# Patient Record
Sex: FEMALE | Race: WHITE | NOT HISPANIC OR LATINO | Employment: UNEMPLOYED | RURAL
[De-identification: names, ages, dates, MRNs, and addresses within clinical notes are randomized per-mention and may not be internally consistent; named-entity substitution may affect disease eponyms.]

---

## 2018-12-28 ENCOUNTER — TRANSCRIBE ORDERS (OUTPATIENT)
Dept: PHYSICAL THERAPY | Facility: HOSPITAL | Age: 58
End: 2018-12-28

## 2018-12-28 DIAGNOSIS — M54.2 NECK PAIN: ICD-10-CM

## 2018-12-28 DIAGNOSIS — M54.9 BACK PAIN, UNSPECIFIED BACK LOCATION, UNSPECIFIED BACK PAIN LATERALITY, UNSPECIFIED CHRONICITY: Primary | ICD-10-CM

## 2019-01-03 ENCOUNTER — HOSPITAL ENCOUNTER (OUTPATIENT)
Dept: PHYSICAL THERAPY | Facility: HOSPITAL | Age: 59
Setting detail: THERAPIES SERIES
Discharge: HOME | End: 2019-01-03

## 2019-01-03 DIAGNOSIS — M54.9 BACK PAIN, UNSPECIFIED BACK LOCATION, UNSPECIFIED BACK PAIN LATERALITY, UNSPECIFIED CHRONICITY: Primary | ICD-10-CM

## 2019-01-03 DIAGNOSIS — M54.2 NECK PAIN: ICD-10-CM

## 2019-01-03 PROCEDURE — 97161 PT EVAL LOW COMPLEX 20 MIN: CPT | Performed by: PHYSICAL THERAPIST

## 2019-01-03 PROCEDURE — 97110 THERAPEUTIC EXERCISES: CPT | Performed by: PHYSICAL THERAPIST

## 2019-01-04 NOTE — THERAPY EVALUATION
Outpatient Physical Therapy Ortho Initial Evaluation  [facility]     Patient Name: Carin Figueredo  : 1960  MRN: [id number]  Today's Date: 1/3/2019      Visit Date: 2019     Subjective Improvement: N/A      Attendance:   Approved:   20 visits        MD follow up:   PRN        RC date:     19      There is no problem list on file for this patient.       Past Medical History:   Diagnosis Date   • Hypertension         Past Surgical History:   Procedure Laterality Date   • APPENDECTOMY     • CHOLECYSTECTOMY     • FOOT SURGERY Right    • HYSTERECTOMY       Allergies   Allergen Reactions   • Keflex [Cephalexin] Other (See Comments)     Upset stomach          Medications: Will bring in list next visit    Visit Dx:     ICD-10-CM ICD-9-CM   1. Back pain, unspecified back location, unspecified back pain laterality, unspecified chronicity M54.9 724.5   2. Neck pain M54.2 723.1       Patient History     Row Name 19 1000             History    Chief Complaint  Pain;Numbness;Joint stiffness;Difficulty with daily activities  -KG      Type of Pain  Back pain;Neck pain  -KG      Date Current Problem(s) Began  -- Chronic; worsened over the past year  -KG      Brief Description of Current Complaint  Pt presents with c/o chronic neck and back pain. Pt states it has started to get worse over the past year. 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 Pt states she has muscle spasms/tightness in between her shoulder blades, superior shoulder, and into her neck. States she does have numbness tingling in the R hand/fingers, mostly in first 3 digits. Pt states her neck also feels stiff most days. Pt states her low back hurts at times as well. States it feels like it starts at her mid back and works it's way down. States it even effects her legs as well, more so her L vs  Right. States it's more of a cramping in her legs. Does take potassium and vitamin D.  -KG      Patient/Caregiver Goals  Relieve pain;Return to work;Improve mobility;Improve strength;Return to prior level of function  -KG      Hand Dominance  right-handed  -KG      Occupation/sports/leisure activities  Works at [employer]. Pt is/was working as a CNA but unable to do that at this time due to pain. 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 Pt enjoys reading and crocheting, spending time grandkids  -KG      Results of Clinical Tests  Per pt report: Osteoponia, degenerative disc disease, bulging discs (unsure if the disc is in her neck or back)  -KG         Pain     Pain Location  Neck;Back  -KG      Pain at Present  4  -KG      Pain at Best  3  -KG      Pain at Worst  8  -KG      Pain Frequency  Constant/continuous  -KG      Pain Description  Aching;Numbness;Sharp;Tingling  -KG      What Performance Factors Make the Current Problem(s) WORSE?  Lifting, bending, housecleaning, CNA work duties  -KG      What Performance Factors Make the Current Problem(s) BETTER?  MHP helps at times.  -KG      Tolerance Time- Standing  15-20 minutes  -KG      Tolerance Time- Sitting  Short periods; causes more increased low back pain  -KG      Tolerance Time- Walking  Increased distances or when up on feet for extended periods of time.  -KG      Tolerance Time- Lying  Pt is a side sleeper; constantly changing positions  -KG      Is your sleep disturbed?  Yes  -KG      What position do you sleep in?  Left sidelying;Right sidelying  -KG      Difficulties at work?  Unable to complete regular job duties such as lifting and transferring pt's.  -KG      Difficulties with ADL's?  Independent but with increased pain  -KG      Difficulties with recreational activities?  Unable to read or chrochet. Has pain playing with grandchildren, mostly in hands and neck  -KG        User Key  (r) = Recorded By, (t) = Taken By, (c) = Cosigned By     Initials Name Provider Type    KG Nevin Perry ANTONIA, PT Physical Therapist          PT Ortho     Row Name 01/03/19 0900       Precautions and Contraindications    Precautions/Limitations  --  -KG    Precautions  Osteopenia  -KG       Subjective Pain    Able to rate subjective pain?  yes  -KG    Pre-Treatment Pain Level  4  -KG    Post-Treatment Pain Level  3  -KG       Posture/Observations    Posture/Observations Comments  No acute distress. Ambulates with non-antalgic gait. Fair/poor postural awareness noted overall. Decreased lordosis noted in cervical spine. R ASIS inferior vs L. R sacral base more prominent. Pt very hypersensitive to mild pressure at lateral hip/IT band and piriformis.  -KG       Quarter Clearing    Quarter Clearing  Upper Quarter Clearing;Lower Quarter Clearing  -KG       DTR- Upper Quarter Clearing    Biceps (C5/6)  Bilateral:;2- Normal response  -KG    Brachioradialis (C6)  Bilateral:;2- Normal response  -KG    Triceps (C7)  Bilateral:;2- Normal response  -KG       Sensory Screen for Light Touch- Upper Quarter Clearing    C4 (posterior shoulder)  Bilateral:;Intact  -KG    C5 (lateral upper arm)  Bilateral:;Intact  -KG    C6 (tip of thumb)  Left:;Diminished;Right:;Intact  -KG    C7 (tip of 3rd finger)  Left:;Diminished;Right:;Intact  -KG    C8 (tip of 5th finger)  Left:;Diminished;Right:;Intact  -KG    T1 (medial lower arm)  Bilateral:;Intact  -KG       Myotomal Screen- Upper Quarter Clearing    Shoulder flexion (C5)  Bilateral:;WNL  -KG    Elbow flexion/wrist extension (C6)  Bilateral:;WNL  -KG    Elbow extension/wrist flexion (C7)  Bilateral:;WNL  -KG    Finger flexion/ (C8)  Bilateral:;WNL  -KG    Finger abduction (T1)  Bilateral:;WNL  -KG       Cervical/Shoulder ROM Screen    Cervical flexion  Normal  -KG    Cervical extension  Normal  -KG    Cervical lateral flexion  Impaired  -KG    Cervical rotation  Impaired  -KG    Cervical quadrant (Spurling's)  Normal  -KG       Neural Tension  Signs- Lower Quarter Clearing    Slump  Bilateral:;Negative  -KG    SLR  Bilateral:;Negative  -KG       Sensory Screen for Light Touch- Lower Quarter Clearing    L1 (inguinal area)  Bilateral:;Intact  -KG    L2 (anterior mid thigh)  Bilateral:;Intact  -KG    L3 (distal anterior thigh)  Bilateral:;Intact  -KG    L4 (medial lower leg/foot)  Bilateral:;Intact  -KG    L5 (lateral lower leg/great toe)  Bilateral:;Intact  -KG    S1 (bottom of foot)  Bilateral:;Intact  -KG       Myotomal Screen- Lower Quarter Clearing    Hip flexion (L2)  Bilateral:;WNL  -KG    Knee extension (L3)  Bilateral:;WNL  -KG    Ankle DF (L4)  Bilateral:;WNL  -KG    Great toe extension (L5)  Bilateral:;WNL  -KG    Ankle PF (S1)  Bilateral:;WNL  -KG       Lumbar ROM Screen- Lower Quarter Clearing    Lumbar Flexion  Normal  -KG    Lumbar Extension  Impaired 70% full motion; Mild low back pain  -KG    Lumbar Lateral Flexion  Normal Mild midline low back pain bilaterally  -KG       SI/Hip Screen- Lower Quarter Clearing    ASIS compression  Negative  -KG    ASIS distraction  Negative  -KG    Mac's/Izaiah's test  Negative  -KG       Special Tests/Palpation    Special Tests/Palpation  Cervical/Thoracic;Lumbar/SI  -KG       Cervical Palpation    Cervical Palpation- Location?  Cervical facets;Levator scapula;Upper traps;Middle traps;Rhomboids  -KG    Cervical Facets  Left:;Tender C3-C6  -KG    Levator Scapula  Bilateral:;Tender;Guarded/taut R>L  -KG    Upper Traps  Bilateral:;Tender;Guarded/taut R>L  -KG    Middle Traps  Bilateral:;Tender  -KG    Rhomboids  Bilateral:;Tender  -KG       Lumbosacral Palpation    Lumbosacral Palpation?  Yes  -KG    SI  Bilateral:;Tender  -KG    Piriformis  Right:;Tender;Guarded/taut Very hypersensitive to mild pressure  -KG    Erector Spinae (Paraspinals)  Bilateral:;Tender;Guarded/taut Low lumbar  -KG       General ROM    Head/Neck/Trunk  Neck Extension;Neck Flexion;Neck Lt Lateral Flexion;Neck Rt Lateral Flexion;Neck Lt  Rotation;Neck Rt Rotation  -KG    RT Upper Ext  Rt Shoulder Flexion;Rt Shoulder ABduction  -KG    LT Upper Ext  Lt Shoulder ABduction;Lt Shoulder Flexion  -KG    GENERAL ROM COMMENTS  BLE hip/knee/ankle AROM WFL an non-painful. Hip PROM WFL bilaterally, no pain  -KG       Head/Neck/Trunk    Neck Extension AROM  40  -KG    Neck Flexion AROM  60; stretch post neck  -KG    Neck Lt Lateral Flexion AROM  32; stretch contralalterally  -KG    Neck Rt Lateral Flexion AROM  32; stretch contralaterally  -KG    Neck Lt Rotation AROM  60; stretch in upper back  -KG    Neck Rt Rotation AROM  65  -KG       Right Upper Ext    Rt Shoulder Abduction AROM  WNL  -KG    Rt Shoulder Flexion AROM  WNL  -KG    Rt Upper Extremity Comments   No pain  -KG       Left Upper Ext    Lt Shoulder Abduction AROM  WNL  -KG    Lt Shoulder Flexion AROM  WNL  -KG    Lt Upper Extremity Comments   No pain  -KG       MMT (Manual Muscle Testing)    Rt Upper Ext  Rt Shoulder Flexion;Rt Shoulder ABduction  -KG    Lt Upper Ext  Lt Shoulder Flexion;Lt Shoulder ABduction  -KG    Rt Lower Ext  Rt Hip Flexion;Rt Hip ABduction;Rt Hip ADduction;Rt Knee Extension;Rt Knee Flexion;Rt Ankle Dorsiflexion  -KG    Lt Lower Ext  Lt Hip Flexion;Lt Hip ABduction;Lt Hip ADduction;Lt Knee Extension;Lt Knee Flexion;Lt Ankle Dorsiflexion  -KG       MMT Right Upper Ext    Rt Shoulder Flexion MMT, Gross Movement  (5/5) normal  -KG    Rt Shoulder ABduction MMT, Gross Movement  (5/5) normal  -KG       MMT Left Upper Ext    Lt Shoulder Flexion MMT, Gross Movement  (5/5) normal  -KG    Lt Shoulder ABduction MMT, Gross Movement  (5/5) normal  -KG       MMT Right Lower Ext    Rt Hip Flexion MMT, Gross Movement  (4+/5) good plus  -KG    Rt Hip ABduction MMT, Gross Movement  (4+/5) good plus  -KG    Rt Hip ADduction MMT, Gross Movement  (4+/5) good plus  -KG    Rt Knee Extension MMT, Gross Movement  (5/5) normal  -KG    Rt Knee Flexion MMT, Gross Movement  (5/5) normal  -KG    Rt Ankle  Dorsiflexion MMT, Gross Movement  (5/5) normal  -KG       MMT Left Lower Ext    Lt Hip Flexion MMT, Gross Movement  (4+/5) good plus  -KG    Lt Hip ABduction MMT, Gross Movement  (4+/5) good plus  -KG    Lt Hip ADduction MMT, Gross Movement  (4+/5) good plus  -KG    Lt Knee Extension MMT, Gross Movement  (5/5) normal  -KG    Lt Knee Flexion MMT, Gross Movement  (5/5) normal  -KG    Lt Ankle Dorsiflexion MMT, Gross Movement  (5/5) normal  -KG       Sensation    Sensation WNL?  WFL  -KG    Light Touch  Partial deficits in the LUE  -KG       Flexibility    Flexibility Tested?  Upper Extremity;Lower Extremity  -KG       Lower Extremity Flexibility    Hamstrings  Bilateral:;Mildly limited  -KG    Hip External Rotators  Bilateral:;Mildly limited  -KG      User Key  (r) = Recorded By, (t) = Taken By, (c) = Cosigned By    Initials Name Provider Type    KG Nevin Perry, PT Physical Therapist                      Therapy Education  Education Details: POC education. Anatomy education related to condition. Posture education. HEP: see exercise flow sheet for details  Given: HEP, Symptoms/condition management, Pain management, Posture/body mechanics  Program: New  How Provided: Verbal, Demonstration, Written  Provided to: Patient  Level of Understanding: Teach back education performed, Verbalized, Demonstrated           PT OP Goals     Row Name  01/13/19 1000          PT Short Term Goals    STG Date to Achieve  01/24/19  -KG     STG 1  Demonstrate independence/compliance with HEP  -KG     STG 1 Progress  New  -KG     STG 2  Tolerate 45 minute treatment session without increased pain  -KG     STG 2 Progress  New  -KG     STG 3  Demonstrate independence in isometric TrA activities throughout treatment session for carryover into performance of daily functional activities  -KG     STG 3 Progress  New  -KG     STG 4  Subjectively report decreased pain/symptoms in upper back/scapula by 50%  -KG     STG 4 Progress  New  -KG         Long Term Goals    LTG Date to Achieve  02/07/19  -KG     LTG 1  Subjectively report 60% improvement or greater  -KG     LTG 1 Progress  New  -KG     LTG 2  Demonstrate improved Modified oswestry score to 30% or less  -KG     LTG 2 Progress  New  -KG     LTG 3  Demonstrate improved NDI score to 30% or less  -KG     LTG 3 Progress  New  -KG     LTG 4  Demonstrate independence in aquatic treatment session for carryover into independent management  -KG     LTG 4 Progress  New  -KG     LTG 5  Demonstrate independent in proper posture/body mechanics for carryover into performance of daily functional activities  -KG     LTG 5 Progress  New  -KG     LTG 6  Demonstrate bilateral cervical rotation AROM to 70 deg without increased pain  -KG     LTG 6 Progress  New  -KG     LTG 7  Tolerate 15 minutes of standing therex without increased pain  -KG     LTG 7 Progress  New  -KG        Time Calculation    PT Goal Re-Cert Due Date  01/24/19  -KG       User Key  (r) = Recorded By, (t) = Taken By, (c) = Cosigned By    Initials Name Provider Type    KG Nevin Perry, PT Physical Therapist          PT Assessment/Plan     Row Name  01/03/19 1000          PT Assessment    Functional Limitations  Impaired locomotion;Limitation in home management;Limitations in community activities;Limitations in functional capacity and performance;Performance in leisure activities;Performance in self-care ADL  -KG     Impairments  Impaired flexibility;Impaired muscle endurance;Joint mobility;Muscle strength;Pain;Poor body mechanics;Posture;Range of motion  -KG     Assessment Comments  Pt is a 58 y.o. femal presenting with chronic neck & back pain. Neurotension signs negative in BLE's at this time. Pt with possible arthritic changes in cervical spine, decreased cerivcal lordosis present. Pt overall presents with deficits in core & postural stability. Taut/TTP throughout UT's/levator as well as in hips/piriformis R>L. Pt does present with lumbopelvic  malalignment. Pt will benefit from aquatic therapy for improved overall activity tolerance. Pt will benefit from skilled PT services to address these deficits for improvements in overall postura/core stability, functional strength/mobility, and tolerance to daily functional activities.   -KG     Please refer to paper survey for additional self-reported information  Yes  -KG     Rehab Potential  Good  -KG     Patient/caregiver participated in establishment of treatment plan and goals  Yes  -KG     Patient would benefit from skilled therapy intervention  Yes  -KG        PT Plan    PT Frequency  2x/week  -KG     Predicted Duration of Therapy Intervention (Therapy Eval)  4-6 weeks  -KG     Planned CPT's?  PT EVAL LOW COMPLEXITY: 45864;PT RE-EVAL: 80692;PT THER PROC EA 15 MIN: 77583;PT THER ACT EA 15 MIN: 11357;PT MANUAL THERAPY EA 15 MIN: 35583;PT NEUROMUSC RE-EDUCATION EA 15 MIN: 96353;PT AQUATIC THERAPY EA 15 MIN: 54421;PT ELECTRICAL STIM UNATTEND: ;PT THER SUPP EA 15 MIN  -KG     Physical Therapy Interventions (Optional Details)  aquatics exercise;home exercise program;joint mobilization;lumbar stabilization;dry needling;manual therapy techniques;modalities;neuromuscular re-education;patient/family education;postural re-education;strengthening;stretching;ROM (Range of Motion)  -KG     PT Plan Comments  Will begin 1x/pool, 1x/land per week. If pt with good tolerance to both land & pool, will potentially see pt for back to back land/pool. Focus on overall postural & core stabliziation. Global hip/neck/UE stretching. Manual to cervical spine to include STM/MFR to UT/LS/paraspinals/sub-occipitals and posterior hip/lumbar paraspinals. Modalities prn for pain.  -KG       User Key  (r) = Recorded By, (t) = Taken By, (c) = Cosigned By    Initials Name Provider Type    KG Nevin Perry, PT Physical Therapist         Modalities     Row Name 01/03/19 0900             Subjective Comments    Subjective Comments  Refer to  "therapy pt history for details.  -KG        User Key  (r) = Recorded By, (t) = Taken By, (c) = Cosigned By    Initials Name Provider Type    Nevin Raines, PT Physical Therapist         MHP to low/mid back and c-spine for 15 minutes    Manual Therapy: Cervical spine; bilateral UT/levator; interscap muscles: STM/MFR. Manual cervical distraction, PROM, manual UT stretch for 6 min         Exercises     Row Name  01/03/19 0900             Precautions    Existing Precautions/Restrictions  Other (see comments) (Osteopenia)  -KG         Subjective Comments    Subjective Comments  Refer to therapy pt history for details.  -KG         Subjective Pain    Able to rate subjective pain?  yes  -KG      Pre-Treatment Pain Level  4  -KG      Post-Treatment Pain Level  3  -KG         Aquatics    Aquatics performed?  No  -KG         Exercise 1    Exercise Name 1  Supine piriformis stretch  -KG      Cueing 1  Verbal  -KG      Reps 1  2  -KG      Time 1  30\" hold  -KG         Exercise 2    Exercise Name 2  SKTC stretch  -KG      Cueing 2  Verbal  -KG      Reps 2  2  -KG      Time 2  30\" hold  -KG         Exercise 3    Exercise Name 3  Supine chin tucks  -KG      Cueing 3  Verbal  -KG      Sets 3  1  -KG      Reps 3  10  -KG         Exercise 4    Exercise Name 4  UT stretch  -KG      Cueing 4  Verbal  -KG      Reps 4  2  -KG      Time 4  30\" hold  -KG         Exercise 5    Exercise Name 5  Levator stretch  -KG      Cueing 5  Verbal  -KG      Reps 5  2  -KG      Time 5  30\" hold  -KG         Exercise 6    Exercise Name 6  Scap retraction  -KG      Cueing 6  Verbal  -KG      Sets 6  1  -KG      Reps 6  10  -KG        User Key  (r) = Recorded By, (t) = Taken By, (c) = Cosigned By    Initials Name Provider Type    Nevin Raines, PT Physical Therapist                   Outcome Measure Options: Leanna Segura, Neck Disability Index (NDI)  Modified Oswestry  Modified Oswestry Score/Comments: 44%  Neck Disability Index  Section 1 " - Pain Intensity: The pain is moderate at the moment.  Section 2 - Personal Care: I can look after myself normally without causing extra pain.  Section 3 - Lifting: Pain prevents me from lifting heavy weights, but I can manage light weights if they are conveniently positioned.  Section 4 - Work: I can't do my usual work.  Section 5 - Headaches: I have moderate headaches that come infrequently.  Section 6 - Concentration: I have a fair degree of difficulty concentrating.  Section 7 - Sleeping: My sleep is mildly disturbed for up to 1-2 hours.  Section 8 - Driving: I can drive as long as I want with slight neck pain.  Section 9 - Reading: I can't read as much as I want because of moderate neck pain.  Section 10 - Recreation: I have neck pain with most recreational activities.  Neck Disability Index Score: 21  Neck Disability Index Comments: 42%      Time Calculation:     Therapy Suggested Charges     Code   Minutes Charges    None             Start Time: 1022  Stop Time: 1129  Time Calculation (min): 67 min  Total Timed Code Minutes- PT: 52 minute(s)     Therapy Charges for Today     Code Description Service Date Service Provider Modifiers Qty    65795087955 HC PT EVAL LOW COMPLEXITY 2 1/3/2019 Nevin Perry, PT GP 1    03582726878 HC PT THER PROC EA 15 MIN 1/3/2019 Nevin Perry, PT GP 1    00124334791 HC PT THER SUPP EA 15 MIN 1/3/2019 Nevin Perry, PT GP 1          PT G-Codes  Outcome Measure Options: Leanna Segura, Neck Disability Index (NDI)  Modified Oswestry Score/Comments: 44%  Neck Disability Index Score: 21         Nevin Perry PT  1/3/2019

## 2019-01-07 ENCOUNTER — HOSPITAL ENCOUNTER (OUTPATIENT)
Dept: PHYSICAL THERAPY | Facility: HOSPITAL | Age: 59
Setting detail: THERAPIES SERIES
Discharge: HOME | End: 2019-01-07

## 2019-01-07 DIAGNOSIS — M54.9 BACK PAIN, UNSPECIFIED BACK LOCATION, UNSPECIFIED BACK PAIN LATERALITY, UNSPECIFIED CHRONICITY: Primary | ICD-10-CM

## 2019-01-07 DIAGNOSIS — M54.2 NECK PAIN: ICD-10-CM

## 2019-01-07 PROCEDURE — 97113 AQUATIC THERAPY/EXERCISES: CPT

## 2019-01-07 NOTE — THERAPY TREATMENT NOTE
Outpatient Physical Therapy Ortho Treatment Note  [facility]     Patient Name: Carin Figueredo  : 1960  MRN: [id number]  Today's Date: 2019      Visit Date: 2019  Subjective Improvement: N/A      Attendance:   Approved:   20 visits        MD follow up:   PRN        RC date:     19      Visit Dx:    ICD-10-CM ICD-9-CM   1. Back pain, unspecified back location, unspecified back pain laterality, unspecified chronicity M54.9 724.5   2. Neck pain M54.2 723.1       There is no problem list on file for this patient.       Past Medical History:   Diagnosis Date   • Hypertension         Past Surgical History:   Procedure Laterality Date   • APPENDECTOMY     • CHOLECYSTECTOMY     • FOOT SURGERY Right    • HYSTERECTOMY         PT Ortho     Row Name 19 1400       Subjective Comments    Subjective Comments  Pt states that her neck is bothering her more than her low back and hips today.  -PM       Precautions and Contraindications    Precautions/Limitations  other (see comments) Pt cannot swim  -PM    Precautions  Osteopenia  -PM       Subjective Pain    Able to rate subjective pain?  yes  -PM    Pre-Treatment Pain Level  5 more neck  -PM    Post-Treatment Pain Level  7 low back; neck 4/10  -PM       Posture/Observations    Posture/Observations Comments  cues for postural alignment in H2o  -PM      User Key  (r) = Recorded By, (t) = Taken By, (c) = Cosigned By    Initials Name Provider Type    PM Clara Barraza PTA Physical Therapy Assistant                      PT Assessment/Plan     Row Name 19 1400          PT Assessment    Assessment Comments  Pt required numerous cues to control posture with AQ walking, some less with excs. Pt with incr LBP post Rx and advised to stretch and rest/use ice or heat as she prefers.  -PM        PT Plan    PT Frequency  2x/week  -PM     Predicted Duration of Therapy Intervention (Therapy Eval)  4-6 weeks  -PM     PT Plan Comments   "1P/1L per PT POC; AQ deep scissor lat  -PM       User Key  (r) = Recorded By, (t) = Taken By, (c) = Cosigned By    Initials Name Provider Type    PM Clara Barraza PTA Physical Therapy Assistant              Exercises     Row Name 01/07/19 1400             Subjective Comments    Subjective Comments  Pt states that her neck is bothering her more than her low back and hips today.  -PM         Subjective Pain    Able to rate subjective pain?  yes  -PM      Pre-Treatment Pain Level  5 more neck  -PM      Post-Treatment Pain Level  7 low back; neck 4/10  -PM         Aquatics    Aquatics performed?  Yes  -PM         Exercise 1    Exercise Name 1  AQ  walk  forward  -PM      Cueing 1  Verbal;Demo  -PM      Time 1  3'  -PM         Exercise 2    Exercise Name 2  AQ walk lat  -PM      Cueing 2  Verbal;Demo  -PM      Time 2  2'  -PM         Exercise 3    Exercise Name 3  AQ Heel/Toe Raises  -PM      Cueing 3  Verbal  -PM      Reps 3  15  -PM         Exercise 4    Exercise Name 4  AQ 3 way SLR B with posture/iso TA  -PM      Cueing 4  Verbal;Demo  -PM      Reps 4  15 ea  -PM      Additional Comments  deferred flex SLR d/t beginning LBP incr  -PM         Exercise 5    Exercise Name 5  AQ MS w/TA  -PM      Cueing 5  Verbal;Demo  -PM      Reps 5  15  -PM         Exercise 6    Exercise Name 6  AQ wall sit with TA push pull scap ret  -PM      Cueing 6  Verbal;Demo  -PM      Sets 6  1  -PM      Reps 6  15  -PM      Additional Comments  medium Cookie  -PM         Exercise 7    Exercise Name 7  AQ wall sit w/TA push downs  -PM      Cueing 7  Verbal;Tactile  -PM      Sets 7  1  -PM      Reps 7  15  -PM         Exercise 8    Exercise Name 8  AQ wall sit w/TA shoulder hor abd  -PM      Cueing 8  Verbal;Demo  -PM      Sets 8  1  -PM      Reps 8  15  -PM         Exercise 9    Exercise Name 9  AQ UT S  -PM      Cueing 9  Verbal  -PM      Reps 9  2  -PM      Time 9  20\"  -PM         Exercise 10    Exercise Name 10  AQ deep H20 w/Noodle " "and hold to wall bicycle  -PM      Cueing 10  Verbal  -PM      Time 10  2'  -PM         Exercise 11    Exercise Name 11  AQ deep H20 with N and HHA wall hang for distraction/relaxation  -PM      Cueing 11  Verbal  -PM      Time 11  3'  -PM         Exercise 12    Exercise Name 12  AQ HS S @ stairs  -PM      Cueing 12  Verbal  -PM      Reps 12  2  -PM      Time 12  20\"  -PM        User Key  (r) = Recorded By, (t) = Taken By, (c) = Cosigned By    Initials Name Provider Type    PM Clara Barraza, PTA Physical Therapy Assistant                         PT OP Goals     Row Name 01/07/19 1400          PT Short Term Goals    STG Date to Achieve  01/24/19  -PM     STG 1  Demonstrate independence/compliance with HEP  -PM     STG 1 Progress  Ongoing  -PM     STG 2  Tolerate 45 minute treatment session without increased pain  -PM     STG 2 Progress  Ongoing  -PM     STG 3  Demonstrate independence in isometric TrA activities throughout treatment session for carryover into performance of daily functional activities  -PM     STG 3 Progress  Ongoing  -PM     STG 4  Subjectively report decreased pain/symptoms in upper back/scapula by 50%  -PM     STG 4 Progress  Ongoing  -PM        Long Term Goals    LTG Date to Achieve  02/07/19  -PM     LTG 1  Subjectively report 60% improvement or greater  -PM     LTG 1 Progress  Ongoing  -PM     LTG 2  Demonstrate improved Modified oswestry score to 30% or less  -PM     LTG 2 Progress  Ongoing  -PM     LTG 3  Demonstrate improved NDI score to 30% or less  -PM     LTG 3 Progress  Ongoing  -PM     LTG 4  Demonstrate independence in aquatic treatment session for carryover into independent management  -PM     LTG 4 Progress  Ongoing  -PM     LTG 5  Demonstrate independent in proper posture/body mechanics for carryover into performance of daily functional activities  -PM     LTG 5 Progress  Ongoing  -PM     LTG 6  Demonstrate bilateral cervical rotation AROM to 70 deg without increased pain  -PM "     LTG 6 Progress  Ongoing  -PM     LTG 7  Tolerate 15 minutes of standing therex without increased pain  -PM     LTG 7 Progress  Ongoing  -PM        Time Calculation    PT Goal Re-Cert Due Date  01/24/18  -PM       User Key  (r) = Recorded By, (t) = Taken By, (c) = Cosigned By    Initials Name Provider Type    PM Clara Barraza PTA Physical Therapy Assistant          Therapy Education  Given: HEP, Symptoms/condition management, Pain management, Posture/body mechanics  Program: Reinforced  How Provided: Verbal, Demonstration, Written  Provided to: Patient  Level of Understanding: Teach back education performed, Verbalized, Demonstrated              Time Calculation:   Start Time: 1442(pt was waiting in WR/office busy/didn't know where to go)  Stop Time: 1530  Time Calculation (min): 48 min  Therapy Suggested Charges     Code   Minutes Charges    None           Therapy Charges for Today     Code Description Service Date Service Provider Modifiers Qty    93220836932 HC PT AQUATIC THERAPY EA 15 MIN 1/7/2019 Clara Barraza PTA GP 3                    Clara Barraza PTA  1/7/2019

## 2019-01-09 ENCOUNTER — HOSPITAL ENCOUNTER (OUTPATIENT)
Dept: PHYSICAL THERAPY | Facility: HOSPITAL | Age: 59
Setting detail: THERAPIES SERIES
Discharge: HOME | End: 2019-01-09

## 2019-01-09 DIAGNOSIS — M54.9 BACK PAIN, UNSPECIFIED BACK LOCATION, UNSPECIFIED BACK PAIN LATERALITY, UNSPECIFIED CHRONICITY: Primary | ICD-10-CM

## 2019-01-09 DIAGNOSIS — M54.2 NECK PAIN: ICD-10-CM

## 2019-01-09 PROCEDURE — 97110 THERAPEUTIC EXERCISES: CPT

## 2019-01-09 PROCEDURE — 97140 MANUAL THERAPY 1/> REGIONS: CPT

## 2019-01-09 NOTE — THERAPY TREATMENT NOTE
Outpatient Physical Therapy Ortho Treatment Note  [facility]     Patient Name: Carin Figueredo  : 1960  MRN: [id number]  Today's Date: 2019      Visit Date: 2019  Subjective Improvement: N/A      Attendance: 3/3  Approved:   20 visits        MD follow up:   PRN        RC date:     19      Visit Dx:    ICD-10-CM ICD-9-CM   1. Back pain, unspecified back location, unspecified back pain laterality, unspecified chronicity M54.9 724.5   2. Neck pain M54.2 723.1       There is no problem list on file for this patient.       Past Medical History:   Diagnosis Date   • Hypertension         Past Surgical History:   Procedure Laterality Date   • APPENDECTOMY     • CHOLECYSTECTOMY     • FOOT SURGERY Right    • HYSTERECTOMY         PT Ortho     Row Name 19 1500       Subjective Comments    Subjective Comments  Pt states she calmed down pretty good after pool Rx; notes she is very stiff today and she has some pn neck and pn more in L leg than in back.  -PM       Precautions and Contraindications    Precautions/Limitations  -- cannot swim  -PM    Precautions  Osteopenia  -PM       Subjective Pain    Able to rate subjective pain?  yes  -PM    Pre-Treatment Pain Level  4 4-5 L Leg; neck 3/10  -PM    Post-Treatment Pain Level  2  -PM    Row Name 19 1400       Subjective Comments    Subjective Comments  Pt states that her neck is bothering her more than her low back and hips today.  -PM       Precautions and Contraindications    Precautions/Limitations  other (see comments) Pt cannot swim  -PM    Precautions  Osteopenia  -PM       Subjective Pain    Able to rate subjective pain?  yes  -PM    Pre-Treatment Pain Level  5 more neck  -PM    Post-Treatment Pain Level  7 low back; neck /10  -PM       Posture/Observations    Posture/Observations Comments  cues for postural alignment in H2o  -PM      User Key  (r) = Recorded By, (t) = Taken By, (c) = Cosigned By    Initials Name  "Provider Type    PM Clara Barraza PTA Physical Therapy Assistant                      PT Assessment/Plan     Row Name 01/09/19 1500          PT Assessment    Assessment Comments  Pt with TP's UT's and areas R thoracic laterally. Good effort and participation. Not I with chin tuck. Decr pain after manual.   -PM        PT Plan    PT Frequency  2x/week  -PM     Predicted Duration of Therapy Intervention (Therapy Eval)  4-6 weeks  -PM     PT Plan Comments  1P/1L; Land teach seated pelvic N and do SL clam with TA  -PM       User Key  (r) = Recorded By, (t) = Taken By, (c) = Cosigned By    Initials Name Provider Type    Clara Mascorro PTA Physical Therapy Assistant          Modalities     Row Name 01/09/19 1500             Precautions    Existing Precautions/Restrictions  other (see comments) Osteopenia  -PM         Moist Heat    Location  Low/mid back & c-spine prone, CS wedge  -PM      Rx Minutes  15 mins  -PM      MH S/P Rx  Yes  -PM        User Key  (r) = Recorded By, (t) = Taken By, (c) = Cosigned By    Initials Name Provider Type    Clara Mascorro PTA Physical Therapy Assistant          Exercises     Row Name 01/09/19 1500             Precautions    Existing Precautions/Restrictions  other (see comments) Osteopenia  -PM         Subjective Comments    Subjective Comments  Pt states she calmed down pretty good after pool Rx; notes she is very stiff today and she has some pn neck and pn more in L leg than in back.  -PM         Subjective Pain    Able to rate subjective pain?  yes  -PM      Pre-Treatment Pain Level  4 4-5 L Leg; neck 3/10  -PM      Post-Treatment Pain Level  2  -PM         Exercise 1    Exercise Name 1  seated UT S B  -PM      Cueing 1  Verbal;Tactile  -PM      Reps 1  2  -PM      Time 1  30\"  -PM         Exercise 2    Exercise Name 2  seated LS S B  -PM      Cueing 2  Verbal;Tactile  -PM      Reps 2  2  -PM      Time 2  30\"  -PM         Exercise 3    Exercise Name 3  No $ " "seated postural diane  -PM      Cueing 3  Verbal;Demo  -PM      Sets 3  1  -PM      Reps 3  15  -PM         Exercise 4    Exercise Name 4  supine SKTC S  -PM      Cueing 4  Verbal  -PM      Reps 4  2  -PM      Time 4  30\"  -PM         Exercise 5    Exercise Name 5  supine piriformis S  -PM      Cueing 5  Verbal  -PM      Reps 5  2  -PM      Time 5  30\"  -PM         Exercise 6    Exercise Name 6  supine HS S  -PM      Cueing 6  Verbal  -PM      Reps 6  2  -PM      Time 6  30\"  -PM         Exercise 7    Exercise Name 7  supine chin tuck  -PM      Cueing 7  Verbal;Tactile  -PM         Exercise 8    Exercise Name 8  Iso TA/Kegal  -PM      Cueing 8  Verbal;Tactile  -PM      Time 8  3'  -PM         Exercise 9    Exercise Name 9  bridge w/TA  -PM      Cueing 9  Verbal  -PM      Sets 9  1  -PM      Reps 9  15  -PM      Time 9  3\"  -PM        User Key  (r) = Recorded By, (t) = Taken By, (c) = Cosigned By    Initials Name Provider Type    PM Clara Barraza PTA Physical Therapy Assistant                        Manual Rx (last 36 hours)      Manual Treatments     Row Name 01/09/19 1500             Manual Rx 1    Manual Rx 1 Location  Cervical spine; bilateral UT/levator; interscap muscles  -PM      Manual Rx 1 Type  STM/MFR. Manual cervical distraction, PROM, manual UT stretch  -PM      Manual Rx 1 Duration  8'  -PM         Manual Rx 2    Manual Rx 2 Location  Thoracic/upper lumbar R>L  -PM      Manual Rx 2 Type  STM/MFR  -PM      Manual Rx 2 Duration  6'  -PM        User Key  (r) = Recorded By, (t) = Taken By, (c) = Cosigned By    Initials Name Provider Type    PM Clara Barraza PTA Physical Therapy Assistant          PT OP Goals     Row Name 01/09/19 1500          PT Short Term Goals    STG Date to Achieve  01/24/19  -PM     STG 1  Demonstrate independence/compliance with HEP  -PM     STG 1 Progress  Ongoing  -PM     STG 2  Tolerate 45 minute treatment session without increased pain  -PM     STG 2 Progress  Ongoing "  -PM     STG 3  Demonstrate independence in isometric TrA activities throughout treatment session for carryover into performance of daily functional activities  -PM     STG 3 Progress  Ongoing  -PM     STG 4  Subjectively report decreased pain/symptoms in upper back/scapula by 50%  -PM     STG 4 Progress  Ongoing  -PM        Long Term Goals    LTG Date to Achieve  02/07/19  -PM     LTG 1  Subjectively report 60% improvement or greater  -PM     LTG 1 Progress  Ongoing  -PM     LTG 2  Demonstrate improved Modified oswestry score to 30% or less  -PM     LTG 2 Progress  Ongoing  -PM     LTG 3  Demonstrate improved NDI score to 30% or less  -PM     LTG 3 Progress  Ongoing  -PM     LTG 4  Demonstrate independence in aquatic treatment session for carryover into independent management  -PM     LTG 4 Progress  Ongoing  -PM     LTG 5  Demonstrate independent in proper posture/body mechanics for carryover into performance of daily functional activities  -PM     LTG 5 Progress  Ongoing  -PM     LTG 6  Demonstrate bilateral cervical rotation AROM to 70 deg without increased pain  -PM     LTG 6 Progress  Ongoing  -PM     LTG 7  Tolerate 15 minutes of standing therex without increased pain  -PM     LTG 7 Progress  Ongoing  -PM        Time Calculation    PT Goal Re-Cert Due Date  01/24/18  -PM       User Key  (r) = Recorded By, (t) = Taken By, (c) = Cosigned By    Initials Name Provider Type    Clara Mascorro, ROSARIO Physical Therapy Assistant          Therapy Education  Given: HEP, Symptoms/condition management, Pain management, Posture/body mechanics  Program: Reinforced  How Provided: Verbal, Demonstration, Written  Provided to: Patient  Level of Understanding: Teach back education performed, Verbalized, Demonstrated              Time Calculation:   Start Time: 1523  Stop Time: 1636  Time Calculation (min): 73 min  Total Timed Code Minutes- PT: 58 minute(s)  Therapy Suggested Charges     Code   Minutes Charges    None            Therapy Charges for Today     Code Description Service Date Service Provider Modifiers Qty    69847533975 HC PT THER PROC EA 15 MIN 1/9/2019 Clara Barraza, PTA GP 3    60194816487 HC PT MANUAL THERAPY EA 15 MIN 1/9/2019 Clara Barraza, PTA GP 1    63235389154 HC PT THER SUPP EA 15 MIN 1/9/2019 Clara Barraza, ROSARIO GP 1                    Clara Barraza, ROSARIO  1/9/2019

## 2019-01-16 ENCOUNTER — APPOINTMENT (OUTPATIENT)
Dept: PHYSICAL THERAPY | Facility: HOSPITAL | Age: 59
End: 2019-01-16

## 2019-01-21 ENCOUNTER — APPOINTMENT (OUTPATIENT)
Dept: PHYSICAL THERAPY | Facility: HOSPITAL | Age: 59
End: 2019-01-21

## 2019-01-23 ENCOUNTER — HOSPITAL ENCOUNTER (OUTPATIENT)
Dept: PHYSICAL THERAPY | Facility: HOSPITAL | Age: 59
Setting detail: THERAPIES SERIES
Discharge: HOME | End: 2019-01-23

## 2019-01-23 DIAGNOSIS — M54.2 NECK PAIN: ICD-10-CM

## 2019-01-23 DIAGNOSIS — M54.9 BACK PAIN, UNSPECIFIED BACK LOCATION, UNSPECIFIED BACK PAIN LATERALITY, UNSPECIFIED CHRONICITY: Primary | ICD-10-CM

## 2019-01-23 PROCEDURE — 97140 MANUAL THERAPY 1/> REGIONS: CPT | Performed by: PHYSICAL THERAPIST

## 2019-01-23 PROCEDURE — 97110 THERAPEUTIC EXERCISES: CPT | Performed by: PHYSICAL THERAPIST

## 2019-01-24 NOTE — THERAPY PROGRESS REPORT/RE-CERT
"    Outpatient Physical Therapy Ortho Progress Note  [facility]     Patient Name: Carin Figueredo  : 1960  MRN: [id number]  Today's Date: 2019      Visit Date: 2019     Subjective Improvement: 25%      Attendance:   Approved:   20 visits        MD follow up:   PRN         date:     19        There is no problem list on file for this patient.       Past Medical History:   Diagnosis Date   • Hypertension         Past Surgical History:   Procedure Laterality Date   • APPENDECTOMY     • CHOLECYSTECTOMY     • FOOT SURGERY Right    • HYSTERECTOMY         Visit Dx:     ICD-10-CM ICD-9-CM   1. Back pain, unspecified back location, unspecified back pain laterality, unspecified chronicity M54.9 724.5   2. Neck pain M54.2 723.1           PT Ortho     Row Name 19 1500       Subjective Comments    Subjective Comments  Pt reports 25% overall improvement at this time. States she feels more \"limber\" overall, neck not as sore. RLE hurting more today.  -KG       Precautions and Contraindications    Precautions/Limitations  -- Pt cannot swim  -KG    Precautions  Osteopenia  -KG       Subjective Pain    Post-Treatment Pain Level  3  -KG       Posture/Observations    Posture/Observations Comments  No acute distress. Ambulates with no AD, non-antalgic gait. Continues to require cuing for good posture throughout therex  -KG       Sensory Screen for Light Touch- Upper Quarter Clearing    C4 (posterior shoulder)  Bilateral:;Intact  -KG    C5 (lateral upper arm)  Bilateral:;Intact  -KG    C6 (tip of thumb)  Bilateral:;Intact  -KG    C7 (tip of 3rd finger)  Left:;Diminished;Right:;Intact  -KG    C8 (tip of 5th finger)  Left:;Diminished;Right:;Intact  -KG    T1 (medial lower arm)  Bilateral:;Intact  -KG       Neural Tension Signs- Lower Quarter Clearing    Slump  Bilateral:;Negative  -KG    SLR  Bilateral:;Negative  -KG       Sensory Screen for Light Touch- Lower Quarter Clearing    L1 " (inguinal area)  Bilateral:;Intact  -KG    L2 (anterior mid thigh)  Bilateral:;Intact  -KG    L3 (distal anterior thigh)  Bilateral:;Intact  -KG    L4 (medial lower leg/foot)  Bilateral:;Intact  -KG    L5 (lateral lower leg/great toe)  Bilateral:;Intact  -KG    S1 (bottom of foot)  Bilateral:;Intact  -KG       Myotomal Screen- Lower Quarter Clearing    Hip flexion (L2)  Bilateral:;WNL  -KG    Knee extension (L3)  Bilateral:;WNL  -KG    Ankle DF (L4)  Bilateral:;WNL  -KG    Great toe extension (L5)  Bilateral:;WNL  -KG    Ankle PF (S1)  Bilateral:;WNL  -KG       Lumbar ROM Screen- Lower Quarter Clearing    Lumbar Flexion  Normal  -KG    Lumbar Extension  Impaired  -KG    Lumbar Lateral Flexion  Normal  -KG       Cervical Palpation    Cervical Facets  Left:;Tender  -KG    Levator Scapula  Bilateral:;Tender;Guarded/taut  -KG    Upper Traps  Bilateral:;Tender;Guarded/taut  -KG    Middle Traps  Bilateral:;Tender;Guarded/taut  -KG    Rhomboids  Bilateral:;Tender  -KG       Lumbosacral Palpation    SI  Bilateral:;Tender  -KG       General ROM    GENERAL ROM COMMENTS  BLE hip/knee/ankle AROM WFL an non-painful. Hip PROM WFL bilaterally, no pain  -KG       Head/Neck/Trunk    Neck Extension AROM  40  -KG    Neck Flexion AROM  48  -KG    Neck Lt Lateral Flexion AROM  40  -KG    Neck Rt Lateral Flexion AROM  35  -KG    Neck Lt Rotation AROM  63  -KG    Neck Rt Rotation AROM  66  -KG       Right Upper Ext    Rt Shoulder Abduction AROM  WNl  -KG    Rt Shoulder Flexion AROM  WNL  -KG    Rt Upper Extremity Comments   No pain  -KG       Left Upper Ext    Lt Shoulder Abduction AROM  WNL  -KG    Lt Shoulder Flexion AROM  WNL no pain  -KG       MMT Right Upper Ext    Rt Shoulder Flexion MMT, Gross Movement  (5/5) normal  -KG    Rt Shoulder ABduction MMT, Gross Movement  (5/5) normal  -KG       MMT Left Upper Ext    Lt Shoulder Flexion MMT, Gross Movement  (5/5) normal  -KG    Lt Shoulder ABduction MMT, Gross Movement  (5/5) normal  -KG        MMT Right Lower Ext    Rt Hip Flexion MMT, Gross Movement  (4+/5) good plus  -KG    Rt Hip ABduction MMT, Gross Movement  (4+/5) good plus  -KG    Rt Hip ADduction MMT, Gross Movement  (4+/5) good plus  -KG    Rt Knee Extension MMT, Gross Movement  (5/5) normal  -KG    Rt Knee Flexion MMT, Gross Movement  (5/5) normal  -KG    Rt Ankle Dorsiflexion MMT, Gross Movement  (5/5) normal  -KG       MMT Left Lower Ext    Lt Hip Flexion MMT, Gross Movement  (4+/5) good plus  -KG    Lt Hip ABduction MMT, Gross Movement  (4+/5) good plus  -KG    Lt Hip ADduction MMT, Gross Movement  (4+/5) good plus  -KG    Lt Knee Extension MMT, Gross Movement  (5/5) normal  -KG    Lt Knee Flexion MMT, Gross Movement  (5/5) normal  -KG    Lt Ankle Dorsiflexion MMT, Gross Movement  (5/5) normal  -KG       Sensation    Sensation WNL?  WFL  -KG    Light Touch  Partial deficits in the LUE  -KG      User Key  (r) = Recorded By, (t) = Taken By, (c) = Cosigned By    Initials Name Provider Type    KG Nevin Perry, PT Physical Therapist                      Therapy Education  Given: HEP, Symptoms/condition management, Pain management, Posture/body mechanics  Program: Reinforced  How Provided: Verbal  Provided to: Patient  Level of Understanding: Verbalized, Demonstrated     PT OP Goals     Row Name 01/23/19 1500          PT Short Term Goals    STG Date to Achieve  01/24/19  -KG     STG 1  Demonstrate independence/compliance with HEP  -KG     STG 1 Progress  Ongoing  -KG     STG 2  Tolerate 45 minute treatment session without increased pain  -KG     STG 2 Progress  Ongoing  -KG     STG 3  Demonstrate independence in isometric TrA activities throughout treatment session for carryover into performance of daily functional activities  -KG     STG 3 Progress  Ongoing  -KG     STG 4  Subjectively report decreased pain/symptoms in upper back/scapula by 50%  -KG     STG 4 Progress  Ongoing  -KG        Long Term Goals    LTG Date to Achieve  02/13/19   -KG     LTG 1  Subjectively report 60% improvement or greater  -KG     LTG 1 Progress  Ongoing  -KG     LTG 2  Demonstrate improved Modified oswestry score to 30% or less  -KG     LTG 2 Progress  Ongoing  -KG     LTG 3  Demonstrate improved NDI score to 30% or less  -KG     LTG 3 Progress  Ongoing  -KG     LTG 4  Demonstrate independence in aquatic treatment session for carryover into independent management  -KG     LTG 4 Progress  Ongoing  -KG     LTG 5  Demonstrate independent in proper posture/body mechanics for carryover into performance of daily functional activities  -KG     LTG 5 Progress  Ongoing  -KG     LTG 6  Demonstrate bilateral cervical rotation AROM to 70 deg without increased pain  -KG     LTG 6 Progress  Ongoing  -KG     LTG 7  Tolerate 15 minutes of standing therex without increased pain  -KG     LTG 7 Progress  Ongoing  -KG        Time Calculation    PT Goal Re-Cert Due Date  02/13/19  -KG       User Key  (r) = Recorded By, (t) = Taken By, (c) = Cosigned By    Initials Name Provider Type    KG Nevin Perry, PT Physical Therapist                PT Assessment/Plan     Row Name  01/23/19 1500          PT Assessment    Functional Limitations  Impaired locomotion;Limitation in home management;Limitations in community activities;Limitations in functional capacity and performance;Performance in leisure activities;Performance in self-care ADL  -KG     Impairments  Impaired flexibility;Impaired muscle endurance;Joint mobility;Muscle strength;Pain;Poor body mechanics;Posture;Range of motion  -KG     Assessment Comments  Pt progressing fair with PT at this time. More pain in RLE/low back this date vs neck. Pt continues to have trigger points at bilateral UT's and interscap muscles that responds well to manual. Increased cuing required for good PN posture and TA activation with seated activities. Pt continues to demonstrate impaired core, cervical, and postural stability and will benefit from continued  "skilled PT services to improve.   -KG     Rehab Potential  Good  -KG     Patient/caregiver participated in establishment of treatment plan and goals  Yes  -KG     Patient would benefit from skilled therapy intervention  Yes  -KG        PT Plan    PT Frequency  1x/week  -KG     Predicted Duration of Therapy Intervention (Therapy Eval)  3-4 weeks  -KG     PT Plan Comments  Continue seated PN education, LE stretching, cervical/core/postural stabilization activites. Add SL clamshells to HEP next visit. Will continue 1x/week per pt request.  -KG       User Key  (r) = Recorded By, (t) = Taken By, (c) = Cosigned By    Initials Name Provider Type    KG Nevin Perry, PT Physical Therapist         Exercises     Row Name 01/23/19 1500             Precautions    Existing Precautions/Restrictions  other (see comments) Osteopenia  -KG         Subjective Pain    Able to rate subjective pain?  yes  -KG      Pre-Treatment Pain Level  6  -KG         Exercise 1    Exercise Name 1  Seated hamstring stretch  -KG      Cueing 1  Verbal  -KG      Reps 1  2  -KG      Time 1  30\" hold  -KG         Exercise 2    Exercise Name 2  Seated piriformis stretch  -KG      Cueing 2  Verbal  -KG      Reps 2  2  -KG      Time 2  30\" hold  -KG         Exercise 3    Exercise Name 3  Pelvic tilt/PN education  -KG      Time 3  4 min  -KG         Exercise 4    Exercise Name 4  Seated no moneys + PN/TA  -KG      Cueing 4  Verbal  -KG      Sets 4  2  -KG      Reps 4  10  -KG         Exercise 5    Exercise Name 5  Seated PN/TA alt march  -KG      Cueing 5  Verbal  -KG      Sets 5  2  -KG      Reps 5  10  -KG         Exercise 6    Exercise Name 6  Bridges + hip add  -KG      Cueing 6  Verbal  -KG      Sets 6  2  -KG      Reps 6  10  -KG         Exercise 7    Exercise Name 7  SL Clamshells + TA  -KG      Cueing 7  Verbal  -KG      Sets 7  1  -KG      Reps 7  15  -KG      Additional Comments  Bilateral  -KG        User Key  (r) = Recorded By, (t) = Taken By, " (c) = Cosigned By    Initials Name Provider Type    KG Nevin Perry, PT Physical Therapist                        Outcome Measure Options: Modifed Owestry, Neck Disability Index (NDI)  Modified Oswestry  Modified Oswestry Score/Comments: 42%      Time Calculation:     Therapy Suggested Charges     Code   Minutes Charges    None             Start Time: 1520  Stop Time: 1625  Time Calculation (min): 65 min  Total Timed Code Minutes- PT: 50 minute(s)     Therapy Charges for Today     Code Description Service Date Service Provider Modifiers Qty    65514361010 HC PT THER PROC EA 15 MIN 1/23/2019 Nevin Perry, PT GP 2    92597912261 HC PT MANUAL THERAPY EA 15 MIN 1/23/2019 Nevin Perry, PT GP 1    27183262178 HC PT THER SUPP EA 15 MIN 1/23/2019 Nevin Perry, PT GP 1          PT G-Codes  Outcome Measure Options: Modifed Owestry, Neck Disability Index (NDI)  Modified Oswestry Score/Comments: 42%         Nevin Perry, PT  1/23/2019

## 2019-01-28 ENCOUNTER — HOSPITAL ENCOUNTER (OUTPATIENT)
Dept: PHYSICAL THERAPY | Facility: HOSPITAL | Age: 59
Setting detail: THERAPIES SERIES
Discharge: HOME | End: 2019-01-28

## 2019-01-28 DIAGNOSIS — M54.9 BACK PAIN, UNSPECIFIED BACK LOCATION, UNSPECIFIED BACK PAIN LATERALITY, UNSPECIFIED CHRONICITY: Primary | ICD-10-CM

## 2019-01-28 DIAGNOSIS — M54.2 NECK PAIN: ICD-10-CM

## 2019-01-28 PROCEDURE — 97110 THERAPEUTIC EXERCISES: CPT

## 2019-01-28 NOTE — THERAPY TREATMENT NOTE
Outpatient Physical Therapy Ortho Treatment Note  [facility]     Patient Name: Carin Figueredo  : 1960  MRN: [id number]  Today's Date: 2019      Visit Date: 2019  Visits . Recert . MD f/u PRN. 25% improvement.   Visit Dx:    ICD-10-CM ICD-9-CM   1. Back pain, unspecified back location, unspecified back pain laterality, unspecified chronicity M54.9 724.5   2. Neck pain M54.2 723.1       There is no problem list on file for this patient.       Past Medical History:   Diagnosis Date   • Hypertension         Past Surgical History:   Procedure Laterality Date   • APPENDECTOMY     • CHOLECYSTECTOMY     • FOOT SURGERY Right    • HYSTERECTOMY         PT Ortho     Row Name 19 1500       Precautions and Contraindications    Precautions  Osteopenia  -      User Key  (r) = Recorded By, (t) = Taken By, (c) = Cosigned By    Initials Name Provider Type    Taco Parsons PTA Physical Therapy Assistant                      PT Assessment/Plan     Row Name 19 1600          PT Assessment    Assessment Comments  Good ernst of today's visit with added ther ex. HEP compliance needs improvement. Sub reports of PT gradually helping improve sx.   -        PT Plan    PT Frequency  1x/week  -     Predicted Duration of Therapy Intervention (Therapy Eval)  3-4 weeks  -     PT Plan Comments  Cont PT per POC.  -       User Key  (r) = Recorded By, (t) = Taken By, (c) = Cosigned By    Initials Name Provider Type    Taco Parsons PTA Physical Therapy Assistant          Modalities     Row Name 19 1500             Moist Heat    MH Applied  Yes  -PAWAN      Location  Low/mid back & c-spine  -      Rx Minutes  12 mins  -      MH S/P Rx  Yes  -PAWAN        User Key  (r) = Recorded By, (t) = Taken By, (c) = Cosigned By    Initials Name Provider Type    Taco Parsons PTA Physical Therapy Assistant          Exercises     Row Name 19 1500             Precautions  "   Existing Precautions/Restrictions  other (see comments) Osteopenia  -JW         Subjective Comments    Subjective Comments  Pt c/o R mid back pain primarily today. She reports doing HEP not as often as she should.   -JW         Subjective Pain    Able to rate subjective pain?  yes  -JW      Pre-Treatment Pain Level  4 R scap area  -JW      Post-Treatment Pain Level  3  -JW         Exercise 1    Exercise Name 1  Supine HS S  -JW      Sets 1  2  -JW      Time 1  30\"  -JW         Exercise 2    Exercise Name 2  Supine piriformis S  -JW      Sets 2  2  -JW      Time 2  30\"  -JW         Exercise 3    Exercise Name 3  Manual cerv  -JW      Time 3  8.5'  -JW         Exercise 4    Exercise Name 4  Supine cerv retractions  -JW      Sets 4  2  -JW      Reps 4  10  -JW      Time 4  3\"  -JW         Exercise 5    Exercise Name 5  Bridges with abd  -JW      Sets 5  2  -JW      Reps 5  10  -JW      Additional Comments  red tb  -JW         Exercise 6    Exercise Name 6  S/L Clamshells  -JW      Reps 6  20  -JW         Exercise 7    Exercise Name 7  H/L core stab isometric. Manual alt UE/LE  -JW      Reps 7  8x ea  -JW      Time 7  3\"  -JW         Exercise 8    Exercise Name 8  Seated thoracic ext  -JW      Sets 8  2  -JW      Reps 8  10  -JW      Time 8  2\"  -JW      Additional Comments  white roller in chair  -JW         Exercise 9    Exercise Name 9  B shld ext  -JW      Sets 9  2  -JW      Reps 9  10  -JW      Additional Comments  red tb  -JW        User Key  (r) = Recorded By, (t) = Taken By, (c) = Cosigned By    Initials Name Provider Type    Taco Parsons, PTA Physical Therapy Assistant                         PT OP Goals     Row Name 01/28/19 1500          PT Short Term Goals    STG Date to Achieve  01/24/19  -JW     STG 1  Demonstrate independence/compliance with HEP  -JW     STG 1 Progress  Partially Met;Ongoing  -JW     STG 2  Tolerate 45 minute treatment session without increased pain  -JW     STG 2 Progress  " Ongoing  -JW     STG 3  Demonstrate independence in isometric TrA activities throughout treatment session for carryover into performance of daily functional activities  -     STG 3 Progress  Ongoing;Progressing  -JW     STG 4  Subjectively report decreased pain/symptoms in upper back/scapula by 50%  -     STG 4 Progress  Ongoing  -JW        Long Term Goals    LTG Date to Achieve  02/13/19  -     LTG 1  Subjectively report 60% improvement or greater  -     LTG 1 Progress  Ongoing  -JW     LTG 2  Demonstrate improved Modified oswestry score to 30% or less  -     LTG 2 Progress  Ongoing  -JW     LTG 3  Demonstrate improved NDI score to 30% or less  -     LTG 3 Progress  Ongoing  -JW     LTG 4  Demonstrate independence in aquatic treatment session for carryover into independent management  -     LTG 4 Progress  Ongoing  -     LTG 5  Demonstrate independent in proper posture/body mechanics for carryover into performance of daily functional activities  -     LTG 5 Progress  Ongoing  -     LTG 6  Demonstrate bilateral cervical rotation AROM to 70 deg without increased pain  -     LTG 6 Progress  Ongoing  -     LTG 7  Tolerate 15 minutes of standing therex without increased pain  -     LTG 7 Progress  Ongoing  -       User Key  (r) = Recorded By, (t) = Taken By, (c) = Cosigned By    Initials Name Provider Type     Taco Peguero PTA Physical Therapy Assistant                         Time Calculation:   Start Time: 1512  Stop Time: 1618  Time Calculation (min): 66 min  PT Non-Billable Time (min): 12 min  Total Timed Code Minutes- PT: 54 minute(s)  Therapy Suggested Charges     Code   Minutes Charges    None           Therapy Charges for Today     Code Description Service Date Service Provider Modifiers Qty    30802178887 HC PT THER SUPP EA 15 MIN 1/28/2019 Taco Peguero PTA GP 1    74045806765 HC PT THER PROC EA 15 MIN 1/28/2019 Taco Peguero PTA GP 4                    Taco YAÑEZ  Sudhir, PTA  1/28/2019

## 2019-02-04 ENCOUNTER — HOSPITAL ENCOUNTER (OUTPATIENT)
Dept: PHYSICAL THERAPY | Facility: HOSPITAL | Age: 59
Setting detail: THERAPIES SERIES
Discharge: HOME | End: 2019-02-04

## 2019-02-04 DIAGNOSIS — M54.9 BACK PAIN, UNSPECIFIED BACK LOCATION, UNSPECIFIED BACK PAIN LATERALITY, UNSPECIFIED CHRONICITY: Primary | ICD-10-CM

## 2019-02-04 DIAGNOSIS — M54.2 NECK PAIN: ICD-10-CM

## 2019-02-04 PROCEDURE — 97140 MANUAL THERAPY 1/> REGIONS: CPT | Performed by: PHYSICAL THERAPIST

## 2019-02-04 PROCEDURE — 97110 THERAPEUTIC EXERCISES: CPT | Performed by: PHYSICAL THERAPIST

## 2019-02-04 NOTE — THERAPY TREATMENT NOTE
Outpatient Physical Therapy Ortho Treatment Note  [facility]     Patient Name: Carin Figueredo  : 1960  MRN: [id number]  Today's Date: 2019      Visit Date: 2019     Subjective Improvement:  25%     Attendance:   Approved:      20 visits     MD follow up:    PRN        date:   19        Visit Dx:    ICD-10-CM ICD-9-CM   1. Back pain, unspecified back location, unspecified back pain laterality, unspecified chronicity M54.9 724.5   2. Neck pain M54.2 723.1       There is no problem list on file for this patient.       Past Medical History:   Diagnosis Date   • Hypertension         Past Surgical History:   Procedure Laterality Date   • APPENDECTOMY     • CHOLECYSTECTOMY     • FOOT SURGERY Right    • HYSTERECTOMY         PT Ortho     Row Name 19 1500       Precautions and Contraindications    Precautions  Osteopenia  -KG    Contraindications  Pt cannot swim  -KG       Subjective Pain    Post-Treatment Pain Level  2  -KG       Posture/Observations    Posture/Observations Comments  No acute distress. Ambulates with no AD, non-antalgic gait. Fair postural awareness but improving; continues to require cuing  -KG      User Key  (r) = Recorded By, (t) = Taken By, (c) = Cosigned By    Initials Name Provider Type    Nevin Raines, PT Physical Therapist                      PT Assessment/Plan     Row Name 19 1500          PT Assessment    Functional Limitations  Impaired locomotion;Limitation in home management;Limitations in community activities;Limitations in functional capacity and performance;Performance in leisure activities;Performance in self-care ADL  -KG     Impairments  Impaired flexibility;Impaired muscle endurance;Joint mobility;Muscle strength;Pain;Poor body mechanics;Posture;Range of motion  -KG     Assessment Comments  Pt continues to have trigger point at R interscap muscles that responds well to STM/MFR. Less cuing required this date for good  "posture but still needs assistance.  -KG     Rehab Potential  Good  -KG     Patient/caregiver participated in establishment of treatment plan and goals  Yes  -KG     Patient would benefit from skilled therapy intervention  Yes  -KG        PT Plan    PT Frequency  1x/week  -KG     Predicted Duration of Therapy Intervention (Therapy Eval)  3-4 weeks  -KG     PT Plan Comments  Continue seated core and postural stabilization activities. Continue manual.  -KG       User Key  (r) = Recorded By, (t) = Taken By, (c) = Cosigned By    Initials Name Provider Type    KG Nevin Perry, PT Physical Therapist          Modalities     Row Name 02/04/19 1500             Moist Heat    MH Applied  Yes  -KG      Location  Low/mid back & c-spine; seated in chair  -KG      Rx Minutes  15 mins  -KG      MH S/P Rx  Yes  -KG        User Key  (r) = Recorded By, (t) = Taken By, (c) = Cosigned By    Initials Name Provider Type    Nevin Raines, PT Physical Therapist          Exercises     Row Name 02/04/19 1500             Precautions    Existing Precautions/Restrictions  other (see comments) Osteopenia  -KG         Subjective Comments    Subjective Comments  Pt states her neck is doing better overall. Still having more back pain at this time, mostly at mid thoracic/interscap area.  -KG         Subjective Pain    Able to rate subjective pain?  yes  -KG      Pre-Treatment Pain Level  5  -KG      Post-Treatment Pain Level  2  -KG         Aquatics    Aquatics performed?  No  -KG         Exercise 1    Exercise Name 1  Seated thoracic ext  -KG      Cueing 1  Verbal  -KG      Sets 1  1  -KG      Reps 1  10  -KG      Time 1  5\" hold  -KG      Additional Comments  blue bolster  -KG         Exercise 2    Exercise Name 2  Tband mid rows  -KG      Cueing 2  Verbal  -KG      Sets 2  2  -KG      Reps 2  10  -KG      Additional Comments  Red tband  -KG         Exercise 3    Exercise Name 3  Tband shoulder ext  -KG      Cueing 3  Verbal  -KG      " "Sets 3  2  -KG      Reps 3  10  -KG      Additional Comments  Red tband  -KG         Exercise 4    Exercise Name 4  Supine piriformis stretch  -KG      Cueing 4  Verbal  -KG      Reps 4  2  -KG      Time 4  30\" hold  -KG         Exercise 5    Exercise Name 5  Bridges with abd  -KG      Cueing 5  Verbal  -KG      Sets 5  2  -KG      Reps 5  10  -KG      Additional Comments  Red tband  -KG         Exercise 6    Exercise Name 6  SL Clamshells  -KG      Cueing 6  Verbal  -KG      Sets 6  2  -KG      Reps 6  10  -KG         Exercise 7    Exercise Name 7  Seated no moneys + PN/TA  -KG      Cueing 7  Verbal  -KG      Sets 7  2  -KG      Reps 7  10  -KG         Exercise 8    Exercise Name 8  Seated PN/TA alt march  -KG      Cueing 8  Verbal  -KG      Sets 8  2  -KG      Reps 8  10  -KG        User Key  (r) = Recorded By, (t) = Taken By, (c) = Cosigned By    Initials Name Provider Type    KG Nevin Perry, PT Physical Therapist                        Manual Rx (last 36 hours)      Manual Treatments     Row Name 02/04/19 1500             Manual Rx 1    Manual Rx 1 Location  Cervical spine; sub-occipitals, UT/levator  -KG      Manual Rx 1 Type  STM/MFR. Manual cervical distraction  -KG      Manual Rx 1 Duration  5 min  -KG         Manual Rx 2    Manual Rx 2 Location  Thoracic/upper lumbar R>L; interscap muscles R  -KG      Manual Rx 2 Type  STM/MFR  -KG      Manual Rx 2 Duration  8 min  -KG        User Key  (r) = Recorded By, (t) = Taken By, (c) = Cosigned By    Initials Name Provider Type    KG Nevin Perry, PT Physical Therapist          PT OP Goals     Row Name 02/04/19 1500          PT Short Term Goals    STG Date to Achieve  01/24/19  -KG     STG 1  Demonstrate independence/compliance with HEP  -KG     STG 1 Progress  Partially Met;Ongoing  -KG     STG 2  Tolerate 45 minute treatment session without increased pain  -KG     STG 2 Progress  Met  -KG     STG 3  Demonstrate independence in isometric TrA activities " throughout treatment session for carryover into performance of daily functional activities  -KG     STG 3 Progress  Ongoing;Progressing  -KG     STG 4  Subjectively report decreased pain/symptoms in upper back/scapula by 50%  -KG     STG 4 Progress  Ongoing  -KG        Long Term Goals    LTG Date to Achieve  02/13/19  -KG     LTG 1  Subjectively report 60% improvement or greater  -KG     LTG 1 Progress  Ongoing  -KG     LTG 2  Demonstrate improved Modified oswestry score to 30% or less  -KG     LTG 2 Progress  Ongoing  -KG     LTG 3  Demonstrate improved NDI score to 30% or less  -KG     LTG 3 Progress  Ongoing  -KG     LTG 4  Demonstrate independence in aquatic treatment session for carryover into independent management  -KG     LTG 4 Progress  Ongoing  -KG     LTG 5  Demonstrate independent in proper posture/body mechanics for carryover into performance of daily functional activities  -KG     LTG 5 Progress  Ongoing  -KG     LTG 6  Demonstrate bilateral cervical rotation AROM to 70 deg without increased pain  -KG     LTG 6 Progress  Ongoing  -KG     LTG 7  Tolerate 15 minutes of standing therex without increased pain  -KG     LTG 7 Progress  Ongoing  -KG        Time Calculation    PT Goal Re-Cert Due Date  02/13/19  -KG       User Key  (r) = Recorded By, (t) = Taken By, (c) = Cosigned By    Initials Name Provider Type    KG Nevin Perry, PT Physical Therapist          Therapy Education  Education Details: Added tband mid rows, shoulder ext, and SL clamshells. Reviewed self TP relase with tennis ball for home use  Given: HEP, Symptoms/condition management, Pain management, Posture/body mechanics  Program: Reinforced, Progressed  How Provided: Verbal, Demonstration, Written  Provided to: Patient  Level of Understanding: Teach back education performed, Verbalized, Demonstrated              Time Calculation:   Start Time: 1520  Stop Time: 1625  Time Calculation (min): 65 min  Total Timed Code Minutes- PT: 50  minute(s)  Therapy Suggested Charges     Code   Minutes Charges    None           Therapy Charges for Today     Code Description Service Date Service Provider Modifiers Qty    82594097677 HC PT THER PROC EA 15 MIN 2/4/2019 Nevin Perry, PT GP 2    17991459953 HC PT MANUAL THERAPY EA 15 MIN 2/4/2019 Nevin Perry, PT GP 1    30825013926 HC PT THER SUPP EA 15 MIN 2/4/2019 Nevin Perry, PT GP 1                    Nevin Perry, PT  2/4/2019

## 2019-02-11 ENCOUNTER — HOSPITAL ENCOUNTER (OUTPATIENT)
Dept: PHYSICAL THERAPY | Facility: HOSPITAL | Age: 59
Setting detail: THERAPIES SERIES
Discharge: HOME | End: 2019-02-11

## 2019-02-11 DIAGNOSIS — M54.2 NECK PAIN: ICD-10-CM

## 2019-02-11 DIAGNOSIS — M54.9 BACK PAIN, UNSPECIFIED BACK LOCATION, UNSPECIFIED BACK PAIN LATERALITY, UNSPECIFIED CHRONICITY: Primary | ICD-10-CM

## 2019-02-11 PROCEDURE — 97110 THERAPEUTIC EXERCISES: CPT | Performed by: PHYSICAL THERAPIST

## 2019-02-11 PROCEDURE — 97140 MANUAL THERAPY 1/> REGIONS: CPT | Performed by: PHYSICAL THERAPIST

## 2019-02-12 NOTE — THERAPY TREATMENT NOTE
Outpatient Physical Therapy Ortho Treatment Note  [facility]     Patient Name: Carin Figueredo  : 1960  MRN: [id number]  Today's Date: 2019      Visit Date: 2019     Subjective Improvement:  25%     Attendance:   Approved:      20 visits     MD follow up:    PRN        date:   19          Visit Dx:    ICD-10-CM ICD-9-CM   1. Back pain, unspecified back location, unspecified back pain laterality, unspecified chronicity M54.9 724.5   2. Neck pain M54.2 723.1       There is no problem list on file for this patient.       Past Medical History:   Diagnosis Date   • Hypertension         Past Surgical History:   Procedure Laterality Date   • APPENDECTOMY     • CHOLECYSTECTOMY     • FOOT SURGERY Right    • HYSTERECTOMY            PT Ortho     Row Name 19 1500       Subjective Comments    Subjective Comments  Pt states she's doing okay this afternoon. Upper/low back hurting more today, neck feeling better.  -KG       Precautions and Contraindications    Precautions  Osteopenia  -KG    Contraindications  Pt cannot swim  -KG       Subjective Pain    Post-Treatment Pain Level  2  -KG       Posture/Observations    Posture/Observations Comments  No acute distress. Ambulates with no AD, non-antalgic gait. Fair postural awareness but improving; continues to require cuing  -KG      User Key  (r) = Recorded By, (t) = Taken By, (c) = Cosigned By    Initials Name Provider Type    Nevin Raines, PT Physical Therapist               PT Assessment/Plan     Row Name  19 1500          PT Assessment    Functional Limitations  Impaired locomotion;Limitation in home management;Limitations in community activities;Limitations in functional capacity and performance;Performance in leisure activities;Performance in self-care ADL  -KG     Impairments  Impaired flexibility;Impaired muscle endurance;Joint mobility;Muscle strength;Pain;Poor body mechanics;Posture;Range of motion  -KG   "   Assessment Comments  Pt able tolerate some standing core stabilization activities this date. Continues to require cuing for PN/posture but improving. No increased pain reported with therex this date. Continues to have more low/mid back pain vs neck pain. Reinforced HEP compliance this date.   -KG     Rehab Potential  Good  -KG     Patient/caregiver participated in establishment of treatment plan and goals  Yes  -KG     Patient would benefit from skilled therapy intervention  Yes  -KG        PT Plan    PT Frequency  1x/week  -KG     Predicted Duration of Therapy Intervention (Therapy Eval)  3-4 weeks  -KG     PT Plan Comments  Recheck next visit  -KG       User Key  (r) = Recorded By, (t) = Taken By, (c) = Cosigned By    Initials Name Provider Type    KG Nevin Perry, PT Physical Therapist         Modalities: MHP to low/mid back & c-spine in seated position; 15 mins post treatment            Exercises     Row Name 02/11/19 1500             Precautions    Existing Precautions/Restrictions  other (see comments) Osteopenia  -KG         Subjective Pain    Able to rate subjective pain?  yes  -KG      Pre-Treatment Pain Level  3  -KG         Aquatics    Aquatics performed?  No  -KG         Exercise 1    Exercise Name 1  Seated hamstring stretch  -KG      Cueing 1  Verbal  -KG      Reps 1  2  -KG      Time 1  30\"  -KG         Exercise 2    Exercise Name 2  Seated thoracic ext  -KG      Cueing 2  Verbal  -KG      Sets 2  1  -KG      Reps 2  10  -KG      Time 2  5\" hold  -KG      Additional Comments  Blue bolster  -KG        User Key  (r) = Recorded By, (t) = Taken By, (c) = Cosigned By    Initials Name Provider Type    KG Nevin Perry, PT Physical Therapist           Manual therapy:   Cervical spine; sub-occipitals, UT/levator: STM/MFR. Manual cervical distraction; 5 min  Thoracic/upper lumbar R>L; interscap muscles R: STM/MFR: 6 min       PT OP Goals     Row Name 02/11/19 1500          PT Short Term Goals    " STG Date to Achieve  01/24/19  -KG     STG 1  Demonstrate independence/compliance with HEP  -KG     STG 1 Progress  Partially Met;Ongoing  -KG     STG 2  Tolerate 45 minute treatment session without increased pain  -KG     STG 2 Progress  Met  -KG     STG 3  Demonstrate independence in isometric TrA activities throughout treatment session for carryover into performance of daily functional activities  -KG     STG 3 Progress  Ongoing;Progressing  -KG     STG 4  Subjectively report decreased pain/symptoms in upper back/scapula by 50%  -KG     STG 4 Progress  Ongoing  -KG        Long Term Goals    LTG Date to Achieve  02/13/19  -KG     LTG 1  Subjectively report 60% improvement or greater  -KG     LTG 1 Progress  Ongoing  -KG     LTG 2  Demonstrate improved Modified oswestry score to 30% or less  -KG     LTG 2 Progress  Ongoing  -KG     LTG 3  Demonstrate improved NDI score to 30% or less  -KG     LTG 3 Progress  Ongoing  -KG     LTG 4  Demonstrate independence in aquatic treatment session for carryover into independent management  -KG     LTG 4 Progress  Ongoing  -KG     LTG 5  Demonstrate independent in proper posture/body mechanics for carryover into performance of daily functional activities  -KG     LTG 5 Progress  Ongoing  -KG     LTG 6  Demonstrate bilateral cervical rotation AROM to 70 deg without increased pain  -KG     LTG 6 Progress  Ongoing  -KG     LTG 7  Tolerate 15 minutes of standing therex without increased pain  -KG     LTG 7 Progress  Ongoing  -KG        Time Calculation    PT Goal Re-Cert Due Date  02/13/19  -KG       User Key  (r) = Recorded By, (t) = Taken By, (c) = Cosigned By    Initials Name Provider Type    KG Nevin Perry, PT Physical Therapist           Therapy Education  Given: HEP, Symptoms/condition management, Pain management, Posture/body mechanics  Program: Reinforced  How Provided: Verbal  Provided to: Patient  Level of Understanding: Verbalized, Demonstrated              Time  Calculation:   Start Time: 1519  Stop Time: 1625  Time Calculation (min): 66 min  Total Timed Code Minutes- PT: 51 minute(s)  Therapy Suggested Charges     Code   Minutes Charges    None           Therapy Charges for Today     Code Description Service Date Service Provider Modifiers Qty    78673843269 HC PT THER PROC EA 15 MIN 2/11/2019 Nevin Perry, PT GP 2    98331577001 HC PT MANUAL THERAPY EA 15 MIN 2/11/2019 Nevin Perry, PT GP 1    42512043519 HC PT THER SUPP EA 15 MIN 2/11/2019 Nevin Perry, PT GP 1                    Nevin Perry, PT  2/11/2019

## 2019-02-20 ENCOUNTER — APPOINTMENT (OUTPATIENT)
Dept: PHYSICAL THERAPY | Facility: HOSPITAL | Age: 59
End: 2019-02-20

## 2019-07-01 ENCOUNTER — DOCUMENTATION (OUTPATIENT)
Dept: PHYSICAL THERAPY | Facility: HOSPITAL | Age: 59
End: 2019-07-01

## 2019-07-01 DIAGNOSIS — M54.9 BACK PAIN, UNSPECIFIED BACK LOCATION, UNSPECIFIED BACK PAIN LATERALITY, UNSPECIFIED CHRONICITY: Primary | ICD-10-CM

## 2019-07-01 DIAGNOSIS — M54.2 NECK PAIN: ICD-10-CM

## 2019-07-01 NOTE — THERAPY DISCHARGE NOTE
Outpatient Physical Therapy Discharge Summary         Patient Name: Carin Figueredo  : 1960  MRN: [id number]    Today's Date: 2019    Visit Dx:    ICD-10-CM ICD-9-CM   1. Back pain, unspecified back location, unspecified back pain laterality, unspecified chronicity M54.9 724.5   2. Neck pain M54.2 723.1       PT OP Goals     Row Name 19 1400          PT Short Term Goals    STG Date to Achieve  19  -KG     STG 1  Demonstrate independence/compliance with HEP  -KG     STG 1 Progress  Partially Met  -KG     STG 2  Tolerate 45 minute treatment session without increased pain  -KG     STG 2 Progress  Met  -KG     STG 3  Demonstrate independence in isometric TrA activities throughout treatment session for carryover into performance of daily functional activities  -KG     STG 3 Progress  Partially Met  -KG     STG 4  Subjectively report decreased pain/symptoms in upper back/scapula by 50%  -KG     STG 4 Progress  Not Met  -KG        Long Term Goals    LTG Date to Achieve  19  -KG     LTG 1  Subjectively report 60% improvement or greater  -KG     LTG 1 Progress  Not Met  -KG     LTG 2  Demonstrate improved Modified oswestry score to 30% or less  -KG     LTG 2 Progress  Not Met  -KG     LTG 3  Demonstrate improved NDI score to 30% or less  -KG     LTG 3 Progress  Not Met  -KG     LTG 4  Demonstrate independence in aquatic treatment session for carryover into independent management  -KG     LTG 4 Progress  Not Met  -KG     LTG 5  Demonstrate independent in proper posture/body mechanics for carryover into performance of daily functional activities  -KG     LTG 5 Progress  Not Met  -KG     LTG 6  Demonstrate bilateral cervical rotation AROM to 70 deg without increased pain  -KG     LTG 6 Progress  Not Met  -KG     LTG 7  Tolerate 15 minutes of standing therex without increased pain  -KG     LTG 7 Progress  Not Met  -KG       User Key  (r) = Recorded By, (t) = Taken By, (c) = Cosigned By     Initials Name Provider Type    KG Nevin Perry, PT Physical Therapist          OP PT Discharge Summary  Date of Discharge: 07/01/19  Reason for Discharge: other (comment)(Pt did not return to PT after visit on 2/20/19. Attended 7/11 scheduled therapy sessions)  Outcomes Achieved: Patient able to partially acheive established goals, Refer to plan of care for updates on goals achieved  Discharge Destination: Home with home program      Time Calculation:                    Nevin Perry, PT  7/1/2019

## 2020-07-02 ENCOUNTER — APPOINTMENT (OUTPATIENT)
Dept: CT IMAGING | Facility: HOSPITAL | Age: 60
End: 2020-07-02

## 2020-07-02 ENCOUNTER — APPOINTMENT (OUTPATIENT)
Dept: GENERAL RADIOLOGY | Facility: HOSPITAL | Age: 60
End: 2020-07-02

## 2020-07-02 ENCOUNTER — HOSPITAL ENCOUNTER (EMERGENCY)
Facility: HOSPITAL | Age: 60
Discharge: HOME | End: 2020-07-02
Attending: FAMILY MEDICINE | Admitting: EMERGENCY MEDICINE

## 2020-07-02 VITALS
HEIGHT: 60 IN | SYSTOLIC BLOOD PRESSURE: 119 MMHG | TEMPERATURE: 98.8 F | OXYGEN SATURATION: 96 % | HEART RATE: 80 BPM | RESPIRATION RATE: 18 BRPM | DIASTOLIC BLOOD PRESSURE: 62 MMHG | BODY MASS INDEX: 27.19 KG/M2 | WEIGHT: 138.5 LBS

## 2020-07-02 DIAGNOSIS — M54.9 UPPER BACK PAIN: ICD-10-CM

## 2020-07-02 DIAGNOSIS — R42 DIZZINESS: ICD-10-CM

## 2020-07-02 DIAGNOSIS — R06.02 SHORTNESS OF BREATH: Primary | ICD-10-CM

## 2020-07-02 LAB
ALBUMIN SERPL-MCNC: 4.4 G/DL (ref 3.5–5.2)
ALBUMIN/GLOB SERPL: 1.7 G/DL
ALP SERPL-CCNC: 89 U/L (ref 39–117)
ALT SERPL W P-5'-P-CCNC: 22 U/L (ref 1–33)
ANION GAP SERPL CALCULATED.3IONS-SCNC: 13 MMOL/L (ref 5–15)
AST SERPL-CCNC: 24 U/L (ref 1–32)
BASOPHILS # BLD AUTO: 0.01 10*3/MM3 (ref 0–0.2)
BASOPHILS NFR BLD AUTO: 0.2 % (ref 0–1.5)
BILIRUB SERPL-MCNC: 0.2 MG/DL (ref 0.2–1.2)
BUN SERPL-MCNC: 13 MG/DL (ref 6–20)
BUN/CREAT SERPL: 14.4 (ref 7–25)
CALCIUM SPEC-SCNC: 10.3 MG/DL (ref 8.6–10.5)
CHLORIDE SERPL-SCNC: 100 MMOL/L (ref 98–107)
CO2 SERPL-SCNC: 25 MMOL/L (ref 22–29)
CREAT SERPL-MCNC: 0.9 MG/DL (ref 0.57–1)
D-DIMER, QUANTITATIVE (MAD,POW, STR): 1445 NG/ML (FEU) (ref 0–470)
DEPRECATED RDW RBC AUTO: 40.8 FL (ref 37–54)
EOSINOPHIL # BLD AUTO: 0.03 10*3/MM3 (ref 0–0.4)
EOSINOPHIL NFR BLD AUTO: 0.5 % (ref 0.3–6.2)
ERYTHROCYTE [DISTWIDTH] IN BLOOD BY AUTOMATED COUNT: 12.5 % (ref 12.3–15.4)
GFR SERPL CREATININE-BSD FRML MDRD: 64 ML/MIN/1.73
GLOBULIN UR ELPH-MCNC: 2.6 GM/DL
GLUCOSE SERPL-MCNC: 99 MG/DL (ref 65–99)
HCT VFR BLD AUTO: 38.2 % (ref 34–46.6)
HGB BLD-MCNC: 12.7 G/DL (ref 12–15.9)
HOLD SPECIMEN: NORMAL
IMM GRANULOCYTES # BLD AUTO: 0.03 10*3/MM3 (ref 0–0.05)
IMM GRANULOCYTES NFR BLD AUTO: 0.5 % (ref 0–0.5)
LYMPHOCYTES # BLD AUTO: 1.86 10*3/MM3 (ref 0.7–3.1)
LYMPHOCYTES NFR BLD AUTO: 28.7 % (ref 19.6–45.3)
MCH RBC QN AUTO: 30.2 PG (ref 26.6–33)
MCHC RBC AUTO-ENTMCNC: 33.2 G/DL (ref 31.5–35.7)
MCV RBC AUTO: 90.7 FL (ref 79–97)
MONOCYTES # BLD AUTO: 0.3 10*3/MM3 (ref 0.1–0.9)
MONOCYTES NFR BLD AUTO: 4.6 % (ref 5–12)
NEUTROPHILS NFR BLD AUTO: 4.24 10*3/MM3 (ref 1.7–7)
NEUTROPHILS NFR BLD AUTO: 65.5 % (ref 42.7–76)
NRBC BLD AUTO-RTO: 0 /100 WBC (ref 0–0.2)
NT-PROBNP SERPL-MCNC: 10.7 PG/ML (ref 5–900)
PLATELET # BLD AUTO: 296 10*3/MM3 (ref 140–450)
PMV BLD AUTO: 10.9 FL (ref 6–12)
POTASSIUM SERPL-SCNC: 4 MMOL/L (ref 3.5–5.2)
PROT SERPL-MCNC: 7 G/DL (ref 6–8.5)
RBC # BLD AUTO: 4.21 10*6/MM3 (ref 3.77–5.28)
SODIUM SERPL-SCNC: 138 MMOL/L (ref 136–145)
TROPONIN T SERPL-MCNC: <0.01 NG/ML (ref 0–0.03)
TROPONIN T SERPL-MCNC: <0.01 NG/ML (ref 0–0.03)
WBC # BLD AUTO: 6.47 10*3/MM3 (ref 3.4–10.8)

## 2020-07-02 PROCEDURE — 99284 EMERGENCY DEPT VISIT MOD MDM: CPT

## 2020-07-02 PROCEDURE — 84484 ASSAY OF TROPONIN QUANT: CPT | Performed by: PHYSICIAN ASSISTANT

## 2020-07-02 PROCEDURE — 85025 COMPLETE CBC W/AUTO DIFF WBC: CPT | Performed by: PHYSICIAN ASSISTANT

## 2020-07-02 PROCEDURE — 0 IOPAMIDOL PER 1 ML: Performed by: EMERGENCY MEDICINE

## 2020-07-02 PROCEDURE — 85379 FIBRIN DEGRADATION QUANT: CPT | Performed by: PHYSICIAN ASSISTANT

## 2020-07-02 PROCEDURE — 83880 ASSAY OF NATRIURETIC PEPTIDE: CPT | Performed by: PHYSICIAN ASSISTANT

## 2020-07-02 PROCEDURE — 93010 ELECTROCARDIOGRAM REPORT: CPT | Performed by: INTERNAL MEDICINE

## 2020-07-02 PROCEDURE — 93005 ELECTROCARDIOGRAM TRACING: CPT | Performed by: FAMILY MEDICINE

## 2020-07-02 PROCEDURE — 71275 CT ANGIOGRAPHY CHEST: CPT

## 2020-07-02 PROCEDURE — 71045 X-RAY EXAM CHEST 1 VIEW: CPT

## 2020-07-02 PROCEDURE — 80053 COMPREHEN METABOLIC PANEL: CPT | Performed by: PHYSICIAN ASSISTANT

## 2020-07-02 RX ORDER — POTASSIUM CHLORIDE 750 MG/1
10 CAPSULE, EXTENDED RELEASE ORAL 2 TIMES DAILY
COMMUNITY
Start: 2018-06-21

## 2020-07-02 RX ORDER — PANTOPRAZOLE SODIUM 20 MG/1
20 TABLET, DELAYED RELEASE ORAL DAILY
Status: ON HOLD | COMMUNITY
End: 2022-04-27

## 2020-07-02 RX ORDER — DOCUSATE CALCIUM 240 MG
100 CAPSULE ORAL DAILY
COMMUNITY
End: 2022-04-30 | Stop reason: HOSPADM

## 2020-07-02 RX ORDER — METHYLPHENIDATE HYDROCHLORIDE 5 MG/1
5 TABLET ORAL 2 TIMES DAILY
COMMUNITY
Start: 2018-05-23

## 2020-07-02 RX ORDER — ATORVASTATIN CALCIUM 20 MG/1
20 TABLET, FILM COATED ORAL DAILY
COMMUNITY
Start: 2018-06-09 | End: 2023-02-21

## 2020-07-02 RX ORDER — GABAPENTIN 300 MG/1
300 CAPSULE ORAL
COMMUNITY
Start: 2018-06-16

## 2020-07-02 RX ORDER — LISINOPRIL 20 MG/1
10 TABLET ORAL DAILY
COMMUNITY

## 2020-07-02 RX ORDER — TRAMADOL HYDROCHLORIDE 50 MG/1
100 TABLET ORAL EVERY 8 HOURS PRN
COMMUNITY
Start: 2018-06-21

## 2020-07-02 RX ORDER — CHOLECALCIFEROL (VITAMIN D3) 50 MCG
2000 TABLET ORAL
COMMUNITY

## 2020-07-02 RX ORDER — SODIUM CHLORIDE 0.9 % (FLUSH) 0.9 %
10 SYRINGE (ML) INJECTION AS NEEDED
Status: DISCONTINUED | OUTPATIENT
Start: 2020-07-02 | End: 2020-07-02 | Stop reason: HOSPADM

## 2020-07-02 RX ADMIN — IOPAMIDOL 62 ML: 755 INJECTION, SOLUTION INTRAVENOUS at 16:49

## 2020-07-02 NOTE — ED NOTES
Patient in CT at this time. This RN will collect 2nd trop when patient returns.      Renea Kelsey, ZACH  07/02/20 1640

## 2020-07-02 NOTE — ED PROVIDER NOTES
Subjective   Patient presents to emergency department for shortness of breath, upper back pain, dizziness for the past few days.  States she had a TIPS procedure performed 2 weeks ago by Dr. Perez.  Denies any cough, fever/chills, abdominal pain, nausea/vomiting, leg pain/swelling.  No history of PE/DVT.  She is not taking any exogenous hormones.  Describes dizziness as light headedness.        History provided by:  Patient   used: No    Shortness of Breath   Severity:  Moderate  Onset quality:  Gradual  Duration:  3 days  Timing:  Intermittent  Progression:  Waxing and waning  Chronicity:  New  Associated symptoms: no abdominal pain, no chest pain, no fever, no neck pain, no sore throat and no vomiting    Risk factors: recent surgery    Risk factors: no hx of PE/DVT, no obesity and no prolonged immobilization    Back Pain   Associated symptoms: no abdominal pain, no chest pain, no dysuria and no fever    Dizziness   Associated symptoms: shortness of breath    Associated symptoms: no chest pain, no nausea and no vomiting        Review of Systems   Constitutional: Negative for chills and fever.   HENT: Negative for sore throat and trouble swallowing.    Respiratory: Positive for shortness of breath.    Cardiovascular: Negative for chest pain.   Gastrointestinal: Negative for abdominal pain, nausea and vomiting.   Genitourinary: Negative for dysuria and flank pain.   Musculoskeletal: Positive for back pain. Negative for neck pain.   Skin: Negative for color change.   Allergic/Immunologic: Negative for immunocompromised state.   Neurological: Positive for dizziness.   Hematological: Does not bruise/bleed easily.   Psychiatric/Behavioral: Negative for confusion.       Past Medical History:   Diagnosis Date   • Hypertension        Allergies   Allergen Reactions   • Keflex [Cephalexin] Other (See Comments)     Upset stomach       Past Surgical History:   Procedure Laterality Date   • APPENDECTOMY    "  • CHOLECYSTECTOMY     • FOOT SURGERY Right    • HYSTERECTOMY         History reviewed. No pertinent family history.    Social History     Socioeconomic History   • Marital status:      Spouse name: Not on file   • Number of children: Not on file   • Years of education: Not on file   • Highest education level: Not on file   Tobacco Use   • Smoking status: Never Smoker   • Smokeless tobacco: Never Used   Substance and Sexual Activity   • Alcohol use: Never     Frequency: Never   • Drug use: Never           Objective      /58   Pulse 80   Temp 98.8 °F (37.1 °C) (Oral)   Resp 17   Ht 152.4 cm (60\")   Wt 62.8 kg (138 lb 8 oz)   SpO2 100%   BMI 27.05 kg/m²     Physical Exam   Constitutional: She is oriented to person, place, and time. She appears well-developed and well-nourished.   HENT:   Head: Normocephalic and atraumatic.   Eyes: Conjunctivae are normal.   Cardiovascular: Normal rate, regular rhythm, normal heart sounds and intact distal pulses.   Pulmonary/Chest: Effort normal and breath sounds normal. No respiratory distress. She has no wheezes.   Abdominal: Soft. Bowel sounds are normal. She exhibits no distension and no mass. There is no tenderness. There is no guarding.   Musculoskeletal: She exhibits no edema.   Neurological: She is alert and oriented to person, place, and time.   Skin: Skin is warm and dry. Capillary refill takes less than 2 seconds.   Psychiatric: She has a normal mood and affect. Her behavior is normal. Thought content normal.   Nursing note and vitals reviewed.      ECG 12 Lead    Date/Time: 7/2/2020 5:36 PM  Performed by: Deven Em PA-C  Authorized by: Sal Fleming MD   Interpreted by physician  Comparison: not compared with previous ECG   Previous ECG: no previous ECG available  Rhythm: sinus rhythm  Rate: normal  BPM: 72  ST Segments: ST segments normal  Clinical impression: normal ECG                 ED Course      Results for orders placed or " performed during the hospital encounter of 07/02/20   Comprehensive Metabolic Panel   Result Value Ref Range    Glucose 99 65 - 99 mg/dL    BUN 13 6 - 20 mg/dL    Creatinine 0.90 0.57 - 1.00 mg/dL    Sodium 138 136 - 145 mmol/L    Potassium 4.0 3.5 - 5.2 mmol/L    Chloride 100 98 - 107 mmol/L    CO2 25.0 22.0 - 29.0 mmol/L    Calcium 10.3 8.6 - 10.5 mg/dL    Total Protein 7.0 6.0 - 8.5 g/dL    Albumin 4.40 3.50 - 5.20 g/dL    ALT (SGPT) 22 1 - 33 U/L    AST (SGOT) 24 1 - 32 U/L    Alkaline Phosphatase 89 39 - 117 U/L    Total Bilirubin 0.2 0.2 - 1.2 mg/dL    eGFR Non African Amer 64 >60 mL/min/1.73    Globulin 2.6 gm/dL    A/G Ratio 1.7 g/dL    BUN/Creatinine Ratio 14.4 7.0 - 25.0    Anion Gap 13.0 5.0 - 15.0 mmol/L   BNP   Result Value Ref Range    proBNP 10.7 5.0 - 900.0 pg/mL   Troponin   Result Value Ref Range    Troponin T <0.010 0.000 - 0.030 ng/mL   Troponin   Result Value Ref Range    Troponin T <0.010 0.000 - 0.030 ng/mL   CBC Auto Differential   Result Value Ref Range    WBC 6.47 3.40 - 10.80 10*3/mm3    RBC 4.21 3.77 - 5.28 10*6/mm3    Hemoglobin 12.7 12.0 - 15.9 g/dL    Hematocrit 38.2 34.0 - 46.6 %    MCV 90.7 79.0 - 97.0 fL    MCH 30.2 26.6 - 33.0 pg    MCHC 33.2 31.5 - 35.7 g/dL    RDW 12.5 12.3 - 15.4 %    RDW-SD 40.8 37.0 - 54.0 fl    MPV 10.9 6.0 - 12.0 fL    Platelets 296 140 - 450 10*3/mm3    Neutrophil % 65.5 42.7 - 76.0 %    Lymphocyte % 28.7 19.6 - 45.3 %    Monocyte % 4.6 (L) 5.0 - 12.0 %    Eosinophil % 0.5 0.3 - 6.2 %    Basophil % 0.2 0.0 - 1.5 %    Immature Grans % 0.5 0.0 - 0.5 %    Neutrophils, Absolute 4.24 1.70 - 7.00 10*3/mm3    Lymphocytes, Absolute 1.86 0.70 - 3.10 10*3/mm3    Monocytes, Absolute 0.30 0.10 - 0.90 10*3/mm3    Eosinophils, Absolute 0.03 0.00 - 0.40 10*3/mm3    Basophils, Absolute 0.01 0.00 - 0.20 10*3/mm3    Immature Grans, Absolute 0.03 0.00 - 0.05 10*3/mm3    nRBC 0.0 0.0 - 0.2 /100 WBC   D-dimer, Quantitative   Result Value Ref Range    D-Dimer, Quantitative 1,445  (H) 0 - 470 ng/mL (FEU)   Gold Top - SST   Result Value Ref Range    Extra Tube Hold for add-ons.      Xr Chest 1 View    Result Date: 7/2/2020  Narrative: PROCEDURE: XR CHEST 1 VW VIEWS:Single INDICATION: Chest pain COMPARISON: None FINDINGS:   - lines/tubes: None   - cardiac: Size within normal limits.   - mediastinum: Contour within normal limits.   - lungs: No evidence of a focal air space process, pulmonary interstitial edema, nodule(s)/mass.   - pleura: No evidence of  fluid.    - osseous: Unremarkable for age.       Impression: No acute cardiopulmonary process identified Electronically signed by:  Eleanor Key MD  7/2/2020 2:58 PM CDT Workstation: [id number]    Ct Angiogram Chest    Result Date: 7/2/2020  Narrative: PROCEDURE: CT ANGIOGRAM CHEST .      EXAM:  Computed Tomography with CTA       REGION:  Chest     INDICATION:   Shortness of breath, elevated d-dimer  - rule out pulmonary embolism     CLINICAL HISTORY:   Surgery two weeks ago    CORRELATIVE IMAGING:  Chest x-ray dated 7/2/2020                       TECHNIQUE:           - PE / vascular protocol             - reconstructions:  axial, coronal, sagittal, obliques   - computer-generated 3D reconstructions (MIPS) were performed.            - contrast:  intravenous 62 mL of [id number]                     This exam was performed according to our departmental dose-optimization program, which includes automated exposure control, adjustment of the mA and/or kV according to patient size and/or use of iterative reconstruction technique.           COMMENTS:                           - Pulmonary arterial system:     - Main pulmonary artery trunk:  negative     - Left, right main pulmonary arteries: negative     - Lobar arteries: negative     - Segmental arteries: negative    - Systemic vascularity (as visualized):      - Aorta:  grossly negative / normal caliber / no dissection      - roots of great vessels:  grossly negative / normal caliber     - SVC / IVC:   "grossly negative / normal caliber     - Misc (limited visualization):     - pulmonary parenchyma:  Very small area of patchy groundglass opacity in the right upper lobe. There are a few small areas of \"tree-in-bud\" opacification in the upper lobes bilaterally.     - pleura:  negative     - mediastinal / queenie:  negative     - neck, inferior:  grossly wnl     - subdiaphragmatic structures:  grossly negative (limited evaluation)     - osseous:  No acute osseous abnormality identified  .      Impression: 1.  No evidence of pulmonary embolism.       2.  No evidence of acute pathology associated with the visualized aorta.    3. Very small area of patchy groundglass opacification in the right upper lobe, and small areas of \"tree-in-bud\" opacification upper lobes bilaterally, worrisome for atypical infectious process. Other etiologies are possible, but felt less likely. Follow-up recommended to ensure complete resolution Electronically signed by:  Eleanor Key MD  7/2/2020 5:17 PM CDT Workstation: [id number]              Discussed results with patient.  Gave educational materials.  Advised close follow up with PCP.  Return to emergency department for new or worsening symptoms.                               MDM    Final diagnoses:   Shortness of breath   Upper back pain   Dizziness            Deven Em PA-C  07/02/20 1737    "

## 2021-03-10 ENCOUNTER — IMMUNIZATION (OUTPATIENT)
Dept: VACCINE CLINIC | Facility: HOSPITAL | Age: 61
End: 2021-03-10

## 2021-03-10 PROCEDURE — 91300 HC SARSCOV02 VAC 30MCG/0.3ML IM: CPT | Performed by: THORACIC SURGERY (CARDIOTHORACIC VASCULAR SURGERY)

## 2021-03-10 PROCEDURE — 0001A: CPT | Performed by: THORACIC SURGERY (CARDIOTHORACIC VASCULAR SURGERY)

## 2021-03-31 ENCOUNTER — IMMUNIZATION (OUTPATIENT)
Dept: VACCINE CLINIC | Facility: HOSPITAL | Age: 61
End: 2021-03-31

## 2021-03-31 PROCEDURE — 91300 HC SARSCOV02 VAC 30MCG/0.3ML IM: CPT | Performed by: THORACIC SURGERY (CARDIOTHORACIC VASCULAR SURGERY)

## 2021-03-31 PROCEDURE — 0002A: CPT | Performed by: THORACIC SURGERY (CARDIOTHORACIC VASCULAR SURGERY)

## 2022-04-27 ENCOUNTER — APPOINTMENT (OUTPATIENT)
Dept: CT IMAGING | Facility: HOSPITAL | Age: 62
End: 2022-04-27

## 2022-04-27 ENCOUNTER — HOSPITAL ENCOUNTER (OUTPATIENT)
Facility: HOSPITAL | Age: 62
Setting detail: OBSERVATION
LOS: 1 days | Discharge: HOME | End: 2022-04-30
Attending: EMERGENCY MEDICINE | Admitting: INTERNAL MEDICINE

## 2022-04-27 ENCOUNTER — APPOINTMENT (OUTPATIENT)
Dept: GENERAL RADIOLOGY | Facility: HOSPITAL | Age: 62
End: 2022-04-27

## 2022-04-27 DIAGNOSIS — N39.0 UTI (URINARY TRACT INFECTION) WITH PYURIA: ICD-10-CM

## 2022-04-27 DIAGNOSIS — G93.40 ENCEPHALOPATHY ACUTE: ICD-10-CM

## 2022-04-27 DIAGNOSIS — Z74.09 IMPAIRED FUNCTIONAL MOBILITY, BALANCE, GAIT, AND ENDURANCE: ICD-10-CM

## 2022-04-27 DIAGNOSIS — Z74.09 IMPAIRED MOBILITY AND ADLS: ICD-10-CM

## 2022-04-27 DIAGNOSIS — Z78.9 IMPAIRED MOBILITY AND ADLS: ICD-10-CM

## 2022-04-27 DIAGNOSIS — J18.9 PNEUMONIA OF RIGHT UPPER LOBE DUE TO INFECTIOUS ORGANISM: Primary | ICD-10-CM

## 2022-04-27 LAB
ALBUMIN SERPL-MCNC: 4 G/DL (ref 3.5–5.2)
ALBUMIN/GLOB SERPL: 1.4 G/DL
ALP SERPL-CCNC: 179 U/L (ref 39–117)
ALT SERPL W P-5'-P-CCNC: 41 U/L (ref 1–33)
AMPHET+METHAMPHET UR QL: NEGATIVE
AMPHETAMINES UR QL: NEGATIVE
ANION GAP SERPL CALCULATED.3IONS-SCNC: 14 MMOL/L (ref 5–15)
APAP SERPL-MCNC: <5 MCG/ML (ref 0–30)
AST SERPL-CCNC: 23 U/L (ref 1–32)
BACTERIA UR QL AUTO: ABNORMAL /HPF
BARBITURATES UR QL SCN: NEGATIVE
BASOPHILS # BLD AUTO: 0.02 10*3/MM3 (ref 0–0.2)
BASOPHILS NFR BLD AUTO: 0.3 % (ref 0–1.5)
BENZODIAZ UR QL SCN: POSITIVE
BILIRUB SERPL-MCNC: 0.2 MG/DL (ref 0–1.2)
BILIRUB UR QL STRIP: NEGATIVE
BUN SERPL-MCNC: 9 MG/DL (ref 8–23)
BUN/CREAT SERPL: 13 (ref 7–25)
BUPRENORPHINE SERPL-MCNC: NEGATIVE NG/ML
CALCIUM SPEC-SCNC: 9.7 MG/DL (ref 8.6–10.5)
CANNABINOIDS SERPL QL: NEGATIVE
CHLORIDE SERPL-SCNC: 102 MMOL/L (ref 98–107)
CK SERPL-CCNC: 81 U/L (ref 20–180)
CLARITY UR: ABNORMAL
CO2 SERPL-SCNC: 26 MMOL/L (ref 22–29)
COCAINE UR QL: NEGATIVE
COLOR UR: YELLOW
CREAT SERPL-MCNC: 0.69 MG/DL (ref 0.57–1)
D-LACTATE SERPL-SCNC: 1.3 MMOL/L (ref 0.5–2)
DEPRECATED RDW RBC AUTO: 38.9 FL (ref 37–54)
EGFRCR SERPLBLD CKD-EPI 2021: 99.5 ML/MIN/1.73
EOSINOPHIL # BLD AUTO: 0 10*3/MM3 (ref 0–0.4)
EOSINOPHIL NFR BLD AUTO: 0 % (ref 0.3–6.2)
ERYTHROCYTE [DISTWIDTH] IN BLOOD BY AUTOMATED COUNT: 11.9 % (ref 12.3–15.4)
ETHANOL BLD-MCNC: <10 MG/DL (ref 0–10)
ETHANOL UR QL: <0.01 %
FLUAV RNA RESP QL NAA+PROBE: NOT DETECTED
FLUBV RNA RESP QL NAA+PROBE: NOT DETECTED
GLOBULIN UR ELPH-MCNC: 2.9 GM/DL
GLUCOSE SERPL-MCNC: 118 MG/DL (ref 65–99)
GLUCOSE UR STRIP-MCNC: NEGATIVE MG/DL
HCT VFR BLD AUTO: 34.6 % (ref 34–46.6)
HGB BLD-MCNC: 12.3 G/DL (ref 12–15.9)
HGB UR QL STRIP.AUTO: NEGATIVE
HOLD SPECIMEN: NORMAL
HYALINE CASTS UR QL AUTO: ABNORMAL /LPF
IMM GRANULOCYTES # BLD AUTO: 0.11 10*3/MM3 (ref 0–0.05)
IMM GRANULOCYTES NFR BLD AUTO: 1.4 % (ref 0–0.5)
KETONES UR QL STRIP: ABNORMAL
LEUKOCYTE ESTERASE UR QL STRIP.AUTO: ABNORMAL
LIPASE SERPL-CCNC: 13 U/L (ref 13–60)
LYMPHOCYTES # BLD AUTO: 0.99 10*3/MM3 (ref 0.7–3.1)
LYMPHOCYTES NFR BLD AUTO: 12.9 % (ref 19.6–45.3)
MCH RBC QN AUTO: 32 PG (ref 26.6–33)
MCHC RBC AUTO-ENTMCNC: 35.5 G/DL (ref 31.5–35.7)
MCV RBC AUTO: 90.1 FL (ref 79–97)
METHADONE UR QL SCN: NEGATIVE
MONOCYTES # BLD AUTO: 0.22 10*3/MM3 (ref 0.1–0.9)
MONOCYTES NFR BLD AUTO: 2.9 % (ref 5–12)
NEUTROPHILS NFR BLD AUTO: 6.34 10*3/MM3 (ref 1.7–7)
NEUTROPHILS NFR BLD AUTO: 82.5 % (ref 42.7–76)
NITRITE UR QL STRIP: NEGATIVE
NRBC BLD AUTO-RTO: 0 /100 WBC (ref 0–0.2)
OPIATES UR QL: NEGATIVE
OXYCODONE UR QL SCN: NEGATIVE
PCP UR QL SCN: NEGATIVE
PH UR STRIP.AUTO: 5.5 [PH] (ref 5–9)
PLATELET # BLD AUTO: 307 10*3/MM3 (ref 140–450)
PMV BLD AUTO: 9.9 FL (ref 6–12)
POTASSIUM SERPL-SCNC: 3.6 MMOL/L (ref 3.5–5.2)
PROPOXYPH UR QL: NEGATIVE
PROT SERPL-MCNC: 6.9 G/DL (ref 6–8.5)
PROT UR QL STRIP: ABNORMAL
RBC # BLD AUTO: 3.84 10*6/MM3 (ref 3.77–5.28)
RBC # UR STRIP: ABNORMAL /HPF
REF LAB TEST METHOD: ABNORMAL
SALICYLATES SERPL-MCNC: <0.3 MG/DL
SARS-COV-2 RNA RESP QL NAA+PROBE: NOT DETECTED
SODIUM SERPL-SCNC: 142 MMOL/L (ref 136–145)
SP GR UR STRIP: 1.02 (ref 1–1.03)
SQUAMOUS #/AREA URNS HPF: ABNORMAL /HPF
T4 FREE SERPL-MCNC: 1 NG/DL (ref 0.93–1.7)
TRICYCLICS UR QL SCN: NEGATIVE
TROPONIN T SERPL-MCNC: <0.01 NG/ML (ref 0–0.03)
TROPONIN T SERPL-MCNC: <0.01 NG/ML (ref 0–0.03)
TSH SERPL DL<=0.05 MIU/L-ACNC: 0.45 UIU/ML (ref 0.27–4.2)
UROBILINOGEN UR QL STRIP: ABNORMAL
WBC # UR STRIP: ABNORMAL /HPF
WBC NRBC COR # BLD: 7.68 10*3/MM3 (ref 3.4–10.8)

## 2022-04-27 PROCEDURE — 71045 X-RAY EXAM CHEST 1 VIEW: CPT

## 2022-04-27 PROCEDURE — 81001 URINALYSIS AUTO W/SCOPE: CPT | Performed by: EMERGENCY MEDICINE

## 2022-04-27 PROCEDURE — G0378 HOSPITAL OBSERVATION PER HR: HCPCS

## 2022-04-27 PROCEDURE — 99285 EMERGENCY DEPT VISIT HI MDM: CPT

## 2022-04-27 PROCEDURE — 96367 TX/PROPH/DG ADDL SEQ IV INF: CPT

## 2022-04-27 PROCEDURE — 36415 COLL VENOUS BLD VENIPUNCTURE: CPT | Performed by: EMERGENCY MEDICINE

## 2022-04-27 PROCEDURE — 93005 ELECTROCARDIOGRAM TRACING: CPT | Performed by: EMERGENCY MEDICINE

## 2022-04-27 PROCEDURE — 96365 THER/PROPH/DIAG IV INF INIT: CPT

## 2022-04-27 PROCEDURE — 36415 COLL VENOUS BLD VENIPUNCTURE: CPT

## 2022-04-27 PROCEDURE — 93010 ELECTROCARDIOGRAM REPORT: CPT | Performed by: INTERNAL MEDICINE

## 2022-04-27 PROCEDURE — 80143 DRUG ASSAY ACETAMINOPHEN: CPT | Performed by: EMERGENCY MEDICINE

## 2022-04-27 PROCEDURE — 85025 COMPLETE CBC W/AUTO DIFF WBC: CPT | Performed by: EMERGENCY MEDICINE

## 2022-04-27 PROCEDURE — 80053 COMPREHEN METABOLIC PANEL: CPT | Performed by: EMERGENCY MEDICINE

## 2022-04-27 PROCEDURE — 96361 HYDRATE IV INFUSION ADD-ON: CPT

## 2022-04-27 PROCEDURE — 84484 ASSAY OF TROPONIN QUANT: CPT | Performed by: EMERGENCY MEDICINE

## 2022-04-27 PROCEDURE — 87086 URINE CULTURE/COLONY COUNT: CPT | Performed by: EMERGENCY MEDICINE

## 2022-04-27 PROCEDURE — 87040 BLOOD CULTURE FOR BACTERIA: CPT | Performed by: EMERGENCY MEDICINE

## 2022-04-27 PROCEDURE — 25010000002 AZITHROMYCIN PER 500 MG: Performed by: EMERGENCY MEDICINE

## 2022-04-27 PROCEDURE — 82550 ASSAY OF CK (CPK): CPT | Performed by: EMERGENCY MEDICINE

## 2022-04-27 PROCEDURE — 84439 ASSAY OF FREE THYROXINE: CPT | Performed by: EMERGENCY MEDICINE

## 2022-04-27 PROCEDURE — 80306 DRUG TEST PRSMV INSTRMNT: CPT | Performed by: EMERGENCY MEDICINE

## 2022-04-27 PROCEDURE — 87636 SARSCOV2 & INF A&B AMP PRB: CPT | Performed by: EMERGENCY MEDICINE

## 2022-04-27 PROCEDURE — 70450 CT HEAD/BRAIN W/O DYE: CPT

## 2022-04-27 PROCEDURE — C9803 HOPD COVID-19 SPEC COLLECT: HCPCS

## 2022-04-27 PROCEDURE — 82077 ASSAY SPEC XCP UR&BREATH IA: CPT | Performed by: EMERGENCY MEDICINE

## 2022-04-27 PROCEDURE — 83605 ASSAY OF LACTIC ACID: CPT | Performed by: EMERGENCY MEDICINE

## 2022-04-27 PROCEDURE — 99284 EMERGENCY DEPT VISIT MOD MDM: CPT

## 2022-04-27 PROCEDURE — 84443 ASSAY THYROID STIM HORMONE: CPT | Performed by: EMERGENCY MEDICINE

## 2022-04-27 PROCEDURE — 25010000002 CEFTRIAXONE PER 250 MG: Performed by: EMERGENCY MEDICINE

## 2022-04-27 PROCEDURE — 80179 DRUG ASSAY SALICYLATE: CPT | Performed by: EMERGENCY MEDICINE

## 2022-04-27 PROCEDURE — 83690 ASSAY OF LIPASE: CPT | Performed by: EMERGENCY MEDICINE

## 2022-04-27 PROCEDURE — 96372 THER/PROPH/DIAG INJ SC/IM: CPT

## 2022-04-27 PROCEDURE — 25010000002 ENOXAPARIN PER 10 MG: Performed by: INTERNAL MEDICINE

## 2022-04-27 RX ORDER — SODIUM CHLORIDE 0.9 % (FLUSH) 0.9 %
10 SYRINGE (ML) INJECTION AS NEEDED
Status: DISCONTINUED | OUTPATIENT
Start: 2022-04-27 | End: 2022-04-30 | Stop reason: HOSPADM

## 2022-04-27 RX ORDER — LORAZEPAM 0.5 MG/1
0.5 TABLET ORAL 2 TIMES DAILY PRN
COMMUNITY

## 2022-04-27 RX ORDER — SODIUM CHLORIDE 9 MG/ML
125 INJECTION, SOLUTION INTRAVENOUS CONTINUOUS
Status: DISCONTINUED | OUTPATIENT
Start: 2022-04-27 | End: 2022-04-28

## 2022-04-27 RX ORDER — GUAIFENESIN/DEXTROMETHORPHAN 100-10MG/5
10 SYRUP ORAL EVERY 6 HOURS PRN
Status: DISCONTINUED | OUTPATIENT
Start: 2022-04-27 | End: 2022-04-30 | Stop reason: HOSPADM

## 2022-04-27 RX ORDER — SODIUM CHLORIDE 0.9 % (FLUSH) 0.9 %
10 SYRINGE (ML) INJECTION EVERY 12 HOURS SCHEDULED
Status: DISCONTINUED | OUTPATIENT
Start: 2022-04-27 | End: 2022-04-30 | Stop reason: HOSPADM

## 2022-04-27 RX ORDER — SODIUM CHLORIDE, SODIUM LACTATE, POTASSIUM CHLORIDE, CALCIUM CHLORIDE 600; 310; 30; 20 MG/100ML; MG/100ML; MG/100ML; MG/100ML
100 INJECTION, SOLUTION INTRAVENOUS CONTINUOUS
Status: DISCONTINUED | OUTPATIENT
Start: 2022-04-27 | End: 2022-04-30 | Stop reason: HOSPADM

## 2022-04-27 RX ORDER — PANTOPRAZOLE SODIUM 20 MG/1
20 TABLET, DELAYED RELEASE ORAL DAILY
Status: DISCONTINUED | OUTPATIENT
Start: 2022-04-28 | End: 2022-04-28

## 2022-04-27 RX ORDER — METOPROLOL SUCCINATE 100 MG/1
100 TABLET, EXTENDED RELEASE ORAL DAILY
COMMUNITY

## 2022-04-27 RX ORDER — ENOXAPARIN SODIUM 100 MG/ML
40 INJECTION SUBCUTANEOUS
Status: DISCONTINUED | OUTPATIENT
Start: 2022-04-27 | End: 2022-04-30 | Stop reason: HOSPADM

## 2022-04-27 RX ORDER — IPRATROPIUM BROMIDE AND ALBUTEROL SULFATE 2.5; .5 MG/3ML; MG/3ML
3 SOLUTION RESPIRATORY (INHALATION) EVERY 6 HOURS PRN
Status: DISCONTINUED | OUTPATIENT
Start: 2022-04-27 | End: 2022-04-30 | Stop reason: HOSPADM

## 2022-04-27 RX ORDER — LEVOFLOXACIN 5 MG/ML
750 INJECTION, SOLUTION INTRAVENOUS EVERY 24 HOURS
Status: DISCONTINUED | OUTPATIENT
Start: 2022-04-27 | End: 2022-04-27

## 2022-04-27 RX ORDER — ATORVASTATIN CALCIUM 20 MG/1
20 TABLET, FILM COATED ORAL DAILY
Status: DISCONTINUED | OUTPATIENT
Start: 2022-04-28 | End: 2022-04-30 | Stop reason: HOSPADM

## 2022-04-27 RX ORDER — DICYCLOMINE HYDROCHLORIDE 10 MG/1
10 CAPSULE ORAL
COMMUNITY

## 2022-04-27 RX ORDER — ACETAMINOPHEN 325 MG/1
650 TABLET ORAL EVERY 6 HOURS PRN
Status: DISCONTINUED | OUTPATIENT
Start: 2022-04-27 | End: 2022-04-30 | Stop reason: HOSPADM

## 2022-04-27 RX ORDER — CETIRIZINE HYDROCHLORIDE 10 MG/1
10 TABLET ORAL DAILY
COMMUNITY

## 2022-04-27 RX ORDER — HYDROCODONE BITARTRATE AND ACETAMINOPHEN 5; 325 MG/1; MG/1
1 TABLET ORAL ONCE
Status: COMPLETED | OUTPATIENT
Start: 2022-04-27 | End: 2022-04-27

## 2022-04-27 RX ORDER — DIAZEPAM 2 MG/1
2 TABLET ORAL NIGHTLY PRN
Status: ON HOLD | COMMUNITY
End: 2022-04-27

## 2022-04-27 RX ORDER — FAMOTIDINE 40 MG/1
40 TABLET, FILM COATED ORAL 2 TIMES DAILY
COMMUNITY

## 2022-04-27 RX ORDER — VILAZODONE HYDROCHLORIDE 40 MG/1
40 TABLET ORAL DAILY
COMMUNITY

## 2022-04-27 RX ORDER — IBUPROFEN 600 MG/1
600 TABLET ORAL EVERY 8 HOURS PRN
Status: DISCONTINUED | OUTPATIENT
Start: 2022-04-27 | End: 2022-04-30 | Stop reason: HOSPADM

## 2022-04-27 RX ORDER — SIMETHICONE 180 MG
180 CAPSULE ORAL 3 TIMES DAILY
COMMUNITY

## 2022-04-27 RX ADMIN — AZITHROMYCIN DIHYDRATE 500 MG: 500 INJECTION, POWDER, LYOPHILIZED, FOR SOLUTION INTRAVENOUS at 20:08

## 2022-04-27 RX ADMIN — SODIUM CHLORIDE 500 ML: 9 INJECTION, SOLUTION INTRAVENOUS at 18:40

## 2022-04-27 RX ADMIN — SODIUM CHLORIDE 125 ML/HR: 9 INJECTION, SOLUTION INTRAVENOUS at 18:40

## 2022-04-27 RX ADMIN — HYDROCODONE BITARTRATE AND ACETAMINOPHEN 1 TABLET: 5; 325 TABLET ORAL at 20:10

## 2022-04-27 RX ADMIN — SODIUM CHLORIDE, POTASSIUM CHLORIDE, SODIUM LACTATE AND CALCIUM CHLORIDE 100 ML/HR: 600; 310; 30; 20 INJECTION, SOLUTION INTRAVENOUS at 23:36

## 2022-04-27 RX ADMIN — ENOXAPARIN SODIUM 40 MG: 40 INJECTION SUBCUTANEOUS at 23:36

## 2022-04-27 RX ADMIN — IBUPROFEN 600 MG: 600 TABLET, FILM COATED ORAL at 23:35

## 2022-04-27 RX ADMIN — CEFTRIAXONE SODIUM 1 G: 1 INJECTION, POWDER, FOR SOLUTION INTRAMUSCULAR; INTRAVENOUS at 19:10

## 2022-04-28 LAB
ALBUMIN SERPL-MCNC: 3.7 G/DL (ref 3.5–5.2)
ALBUMIN/GLOB SERPL: 1.5 G/DL
ALP SERPL-CCNC: 164 U/L (ref 39–117)
ALT SERPL W P-5'-P-CCNC: 36 U/L (ref 1–33)
AMMONIA BLD-SCNC: 13 UMOL/L (ref 11–51)
ANION GAP SERPL CALCULATED.3IONS-SCNC: 17 MMOL/L (ref 5–15)
AST SERPL-CCNC: 32 U/L (ref 1–32)
BACTERIA SPEC AEROBE CULT: NORMAL
BILIRUB SERPL-MCNC: 0.3 MG/DL (ref 0–1.2)
BUN SERPL-MCNC: 10 MG/DL (ref 8–23)
BUN/CREAT SERPL: 15.6 (ref 7–25)
CALCIUM SPEC-SCNC: 9.3 MG/DL (ref 8.6–10.5)
CHLORIDE SERPL-SCNC: 101 MMOL/L (ref 98–107)
CO2 SERPL-SCNC: 23 MMOL/L (ref 22–29)
CREAT SERPL-MCNC: 0.64 MG/DL (ref 0.57–1)
DEPRECATED RDW RBC AUTO: 36.8 FL (ref 37–54)
EGFRCR SERPLBLD CKD-EPI 2021: 101.3 ML/MIN/1.73
ERYTHROCYTE [DISTWIDTH] IN BLOOD BY AUTOMATED COUNT: 11.6 % (ref 12.3–15.4)
GLOBULIN UR ELPH-MCNC: 2.5 GM/DL
GLUCOSE SERPL-MCNC: 122 MG/DL (ref 65–99)
HBA1C MFR BLD: 5.5 % (ref 4.8–5.6)
HCT VFR BLD AUTO: 32.5 % (ref 34–46.6)
HGB BLD-MCNC: 11.7 G/DL (ref 12–15.9)
MAGNESIUM SERPL-MCNC: 1.8 MG/DL (ref 1.6–2.4)
MCH RBC QN AUTO: 31.3 PG (ref 26.6–33)
MCHC RBC AUTO-ENTMCNC: 36 G/DL (ref 31.5–35.7)
MCV RBC AUTO: 86.9 FL (ref 79–97)
PLATELET # BLD AUTO: 294 10*3/MM3 (ref 140–450)
PMV BLD AUTO: 9.7 FL (ref 6–12)
POTASSIUM SERPL-SCNC: 3.5 MMOL/L (ref 3.5–5.2)
PROCALCITONIN SERPL-MCNC: 0.06 NG/ML (ref 0–0.25)
PROT SERPL-MCNC: 6.2 G/DL (ref 6–8.5)
QT INTERVAL: 390 MS
QTC INTERVAL: 464 MS
RBC # BLD AUTO: 3.74 10*6/MM3 (ref 3.77–5.28)
SODIUM SERPL-SCNC: 141 MMOL/L (ref 136–145)
T4 FREE SERPL-MCNC: 1 NG/DL (ref 0.93–1.7)
TSH SERPL DL<=0.05 MIU/L-ACNC: 0.35 UIU/ML (ref 0.27–4.2)
WBC NRBC COR # BLD: 8.74 10*3/MM3 (ref 3.4–10.8)

## 2022-04-28 PROCEDURE — 96366 THER/PROPH/DIAG IV INF ADDON: CPT

## 2022-04-28 PROCEDURE — 97165 OT EVAL LOW COMPLEX 30 MIN: CPT

## 2022-04-28 PROCEDURE — 25010000002 ENOXAPARIN PER 10 MG: Performed by: INTERNAL MEDICINE

## 2022-04-28 PROCEDURE — 84145 PROCALCITONIN (PCT): CPT | Performed by: INTERNAL MEDICINE

## 2022-04-28 PROCEDURE — 85027 COMPLETE CBC AUTOMATED: CPT | Performed by: INTERNAL MEDICINE

## 2022-04-28 PROCEDURE — 80053 COMPREHEN METABOLIC PANEL: CPT | Performed by: INTERNAL MEDICINE

## 2022-04-28 PROCEDURE — 83735 ASSAY OF MAGNESIUM: CPT | Performed by: INTERNAL MEDICINE

## 2022-04-28 PROCEDURE — 25010000002 CEFTRIAXONE PER 250 MG: Performed by: INTERNAL MEDICINE

## 2022-04-28 PROCEDURE — 84443 ASSAY THYROID STIM HORMONE: CPT | Performed by: INTERNAL MEDICINE

## 2022-04-28 PROCEDURE — 82140 ASSAY OF AMMONIA: CPT | Performed by: INTERNAL MEDICINE

## 2022-04-28 PROCEDURE — 83036 HEMOGLOBIN GLYCOSYLATED A1C: CPT | Performed by: INTERNAL MEDICINE

## 2022-04-28 PROCEDURE — 25010000002 AZITHROMYCIN PER 500 MG: Performed by: INTERNAL MEDICINE

## 2022-04-28 PROCEDURE — G0378 HOSPITAL OBSERVATION PER HR: HCPCS

## 2022-04-28 PROCEDURE — 84439 ASSAY OF FREE THYROXINE: CPT | Performed by: INTERNAL MEDICINE

## 2022-04-28 PROCEDURE — 96372 THER/PROPH/DIAG INJ SC/IM: CPT

## 2022-04-28 RX ORDER — LISINOPRIL 10 MG/1
10 TABLET ORAL NIGHTLY
Status: DISCONTINUED | OUTPATIENT
Start: 2022-04-28 | End: 2022-04-30 | Stop reason: HOSPADM

## 2022-04-28 RX ORDER — TRAMADOL HYDROCHLORIDE 50 MG/1
100 TABLET ORAL EVERY 8 HOURS PRN
Status: DISCONTINUED | OUTPATIENT
Start: 2022-04-28 | End: 2022-04-30 | Stop reason: HOSPADM

## 2022-04-28 RX ORDER — VILAZODONE HYDROCHLORIDE 40 MG/1
40 TABLET ORAL NIGHTLY
Status: DISCONTINUED | OUTPATIENT
Start: 2022-04-28 | End: 2022-04-30 | Stop reason: HOSPADM

## 2022-04-28 RX ORDER — VILAZODONE HYDROCHLORIDE 40 MG/1
40 TABLET ORAL
Status: DISCONTINUED | OUTPATIENT
Start: 2022-04-28 | End: 2022-04-28

## 2022-04-28 RX ORDER — METHYLPHENIDATE HYDROCHLORIDE 5 MG/1
7.5 TABLET ORAL 2 TIMES DAILY
Status: DISCONTINUED | OUTPATIENT
Start: 2022-04-28 | End: 2022-04-30 | Stop reason: HOSPADM

## 2022-04-28 RX ORDER — METOPROLOL SUCCINATE 50 MG/1
100 TABLET, EXTENDED RELEASE ORAL DAILY
Status: DISCONTINUED | OUTPATIENT
Start: 2022-04-28 | End: 2022-04-30 | Stop reason: HOSPADM

## 2022-04-28 RX ORDER — LISINOPRIL 10 MG/1
10 TABLET ORAL DAILY
Status: DISCONTINUED | OUTPATIENT
Start: 2022-04-28 | End: 2022-04-28

## 2022-04-28 RX ORDER — FAMOTIDINE 40 MG/1
40 TABLET, FILM COATED ORAL 2 TIMES DAILY
Status: DISCONTINUED | OUTPATIENT
Start: 2022-04-28 | End: 2022-04-30 | Stop reason: HOSPADM

## 2022-04-28 RX ORDER — DICYCLOMINE HYDROCHLORIDE 10 MG/1
10 CAPSULE ORAL
Status: DISCONTINUED | OUTPATIENT
Start: 2022-04-28 | End: 2022-04-30 | Stop reason: HOSPADM

## 2022-04-28 RX ORDER — GABAPENTIN 300 MG/1
300 CAPSULE ORAL 3 TIMES DAILY
Status: DISCONTINUED | OUTPATIENT
Start: 2022-04-28 | End: 2022-04-30 | Stop reason: HOSPADM

## 2022-04-28 RX ADMIN — ENOXAPARIN SODIUM 40 MG: 40 INJECTION SUBCUTANEOUS at 21:06

## 2022-04-28 RX ADMIN — FAMOTIDINE 40 MG: 40 TABLET, FILM COATED ORAL at 14:09

## 2022-04-28 RX ADMIN — GABAPENTIN 300 MG: 300 CAPSULE ORAL at 21:06

## 2022-04-28 RX ADMIN — DICYCLOMINE HYDROCHLORIDE 10 MG: 10 CAPSULE ORAL at 17:30

## 2022-04-28 RX ADMIN — DICYCLOMINE HYDROCHLORIDE 10 MG: 10 CAPSULE ORAL at 14:21

## 2022-04-28 RX ADMIN — ACETAMINOPHEN 650 MG: 325 TABLET ORAL at 03:22

## 2022-04-28 RX ADMIN — AZITHROMYCIN MONOHYDRATE 500 MG: 500 INJECTION, POWDER, LYOPHILIZED, FOR SOLUTION INTRAVENOUS at 21:06

## 2022-04-28 RX ADMIN — TRAMADOL HYDROCHLORIDE 100 MG: 50 TABLET, COATED ORAL at 14:21

## 2022-04-28 RX ADMIN — FAMOTIDINE 40 MG: 40 TABLET, FILM COATED ORAL at 21:06

## 2022-04-28 RX ADMIN — CEFTRIAXONE SODIUM 1 G: 1 INJECTION, POWDER, FOR SOLUTION INTRAMUSCULAR; INTRAVENOUS at 18:38

## 2022-04-28 RX ADMIN — IBUPROFEN 600 MG: 600 TABLET, FILM COATED ORAL at 09:33

## 2022-04-28 RX ADMIN — ACETAMINOPHEN 650 MG: 325 TABLET ORAL at 17:30

## 2022-04-28 RX ADMIN — SODIUM CHLORIDE, POTASSIUM CHLORIDE, SODIUM LACTATE AND CALCIUM CHLORIDE 100 ML/HR: 600; 310; 30; 20 INJECTION, SOLUTION INTRAVENOUS at 13:25

## 2022-04-28 RX ADMIN — PANTOPRAZOLE SODIUM 20 MG: 20 TABLET, DELAYED RELEASE ORAL at 08:22

## 2022-04-28 RX ADMIN — TRAMADOL HYDROCHLORIDE 100 MG: 50 TABLET, COATED ORAL at 23:52

## 2022-04-28 RX ADMIN — ATORVASTATIN CALCIUM 20 MG: 20 TABLET, FILM COATED ORAL at 08:22

## 2022-04-28 RX ADMIN — LISINOPRIL 10 MG: 10 TABLET ORAL at 21:06

## 2022-04-28 RX ADMIN — METOPROLOL SUCCINATE 100 MG: 50 TABLET, EXTENDED RELEASE ORAL at 14:09

## 2022-04-28 RX ADMIN — VILAZODONE HYDROCHLORIDE 40 MG: 40 TABLET ORAL at 21:12

## 2022-04-29 PROCEDURE — G0378 HOSPITAL OBSERVATION PER HR: HCPCS

## 2022-04-29 PROCEDURE — 97162 PT EVAL MOD COMPLEX 30 MIN: CPT

## 2022-04-29 PROCEDURE — 25010000002 ENOXAPARIN PER 10 MG: Performed by: INTERNAL MEDICINE

## 2022-04-29 PROCEDURE — 97535 SELF CARE MNGMENT TRAINING: CPT

## 2022-04-29 PROCEDURE — 96372 THER/PROPH/DIAG INJ SC/IM: CPT

## 2022-04-29 PROCEDURE — 25010000002 AZITHROMYCIN PER 500 MG: Performed by: INTERNAL MEDICINE

## 2022-04-29 RX ORDER — CEFDINIR 300 MG/1
300 CAPSULE ORAL EVERY 12 HOURS SCHEDULED
Status: DISCONTINUED | OUTPATIENT
Start: 2022-04-29 | End: 2022-04-30 | Stop reason: HOSPADM

## 2022-04-29 RX ADMIN — LISINOPRIL 10 MG: 10 TABLET ORAL at 20:09

## 2022-04-29 RX ADMIN — ENOXAPARIN SODIUM 40 MG: 40 INJECTION SUBCUTANEOUS at 20:09

## 2022-04-29 RX ADMIN — TRAMADOL HYDROCHLORIDE 100 MG: 50 TABLET, COATED ORAL at 17:57

## 2022-04-29 RX ADMIN — GABAPENTIN 300 MG: 300 CAPSULE ORAL at 17:57

## 2022-04-29 RX ADMIN — TRAMADOL HYDROCHLORIDE 100 MG: 50 TABLET, COATED ORAL at 08:33

## 2022-04-29 RX ADMIN — GABAPENTIN 300 MG: 300 CAPSULE ORAL at 21:49

## 2022-04-29 RX ADMIN — ATORVASTATIN CALCIUM 20 MG: 20 TABLET, FILM COATED ORAL at 08:29

## 2022-04-29 RX ADMIN — METOPROLOL SUCCINATE 100 MG: 50 TABLET, EXTENDED RELEASE ORAL at 08:29

## 2022-04-29 RX ADMIN — DICYCLOMINE HYDROCHLORIDE 10 MG: 10 CAPSULE ORAL at 08:29

## 2022-04-29 RX ADMIN — GABAPENTIN 300 MG: 300 CAPSULE ORAL at 08:29

## 2022-04-29 RX ADMIN — DICYCLOMINE HYDROCHLORIDE 10 MG: 10 CAPSULE ORAL at 11:20

## 2022-04-29 RX ADMIN — AZITHROMYCIN MONOHYDRATE 500 MG: 500 INJECTION, POWDER, LYOPHILIZED, FOR SOLUTION INTRAVENOUS at 20:09

## 2022-04-29 RX ADMIN — CEFDINIR 300 MG: 300 CAPSULE ORAL at 17:57

## 2022-04-29 RX ADMIN — VILAZODONE HYDROCHLORIDE 40 MG: 40 TABLET ORAL at 21:49

## 2022-04-29 RX ADMIN — DICYCLOMINE HYDROCHLORIDE 10 MG: 10 CAPSULE ORAL at 18:03

## 2022-04-29 RX ADMIN — FAMOTIDINE 40 MG: 40 TABLET, FILM COATED ORAL at 20:09

## 2022-04-29 RX ADMIN — FAMOTIDINE 40 MG: 40 TABLET, FILM COATED ORAL at 08:29

## 2022-04-29 RX ADMIN — SODIUM CHLORIDE, POTASSIUM CHLORIDE, SODIUM LACTATE AND CALCIUM CHLORIDE 100 ML/HR: 600; 310; 30; 20 INJECTION, SOLUTION INTRAVENOUS at 02:57

## 2022-04-29 RX ADMIN — METHYLPHENIDATE HYDROCHLORIDE 7.5 MG: 5 TABLET ORAL at 06:38

## 2022-04-29 RX ADMIN — METHYLPHENIDATE HYDROCHLORIDE 7.5 MG: 5 TABLET ORAL at 17:58

## 2022-04-30 VITALS
OXYGEN SATURATION: 97 % | TEMPERATURE: 97.3 F | HEIGHT: 60 IN | WEIGHT: 130.2 LBS | SYSTOLIC BLOOD PRESSURE: 138 MMHG | DIASTOLIC BLOOD PRESSURE: 67 MMHG | BODY MASS INDEX: 25.56 KG/M2 | HEART RATE: 87 BPM | RESPIRATION RATE: 20 BRPM

## 2022-04-30 PROCEDURE — G0378 HOSPITAL OBSERVATION PER HR: HCPCS

## 2022-04-30 RX ORDER — CEFDINIR 300 MG/1
300 CAPSULE ORAL EVERY 12 HOURS SCHEDULED
Qty: 2 CAPSULE | Refills: 0 | Status: SHIPPED | OUTPATIENT
Start: 2022-04-30 | End: 2022-05-01

## 2022-04-30 RX ADMIN — DICYCLOMINE HYDROCHLORIDE 10 MG: 10 CAPSULE ORAL at 08:35

## 2022-04-30 RX ADMIN — TRAMADOL HYDROCHLORIDE 100 MG: 50 TABLET, COATED ORAL at 08:35

## 2022-04-30 RX ADMIN — CEFDINIR 300 MG: 300 CAPSULE ORAL at 08:35

## 2022-04-30 RX ADMIN — METHYLPHENIDATE HYDROCHLORIDE 7.5 MG: 5 TABLET ORAL at 06:48

## 2022-04-30 RX ADMIN — FAMOTIDINE 40 MG: 40 TABLET, FILM COATED ORAL at 08:35

## 2022-04-30 RX ADMIN — METOPROLOL SUCCINATE 100 MG: 50 TABLET, EXTENDED RELEASE ORAL at 08:35

## 2022-04-30 RX ADMIN — GABAPENTIN 300 MG: 300 CAPSULE ORAL at 08:35

## 2022-04-30 RX ADMIN — ATORVASTATIN CALCIUM 20 MG: 20 TABLET, FILM COATED ORAL at 08:35

## 2022-05-02 LAB
BACTERIA SPEC AEROBE CULT: NORMAL
BACTERIA SPEC AEROBE CULT: NORMAL

## 2022-11-28 DIAGNOSIS — Z78.0 POSTMENOPAUSAL: ICD-10-CM

## 2022-11-28 DIAGNOSIS — Z13.820 OSTEOPOROSIS SCREENING: Primary | ICD-10-CM

## 2023-02-21 ENCOUNTER — OFFICE VISIT (OUTPATIENT)
Dept: OTOLARYNGOLOGY | Facility: CLINIC | Age: 63
End: 2023-02-21
Payer: MEDICARE

## 2023-02-21 VITALS — BODY MASS INDEX: 23.37 KG/M2 | OXYGEN SATURATION: 95 % | HEIGHT: 61 IN | WEIGHT: 123.8 LBS

## 2023-02-21 DIAGNOSIS — J31.0 CHRONIC RHINITIS: ICD-10-CM

## 2023-02-21 DIAGNOSIS — H69.80 ETD (EUSTACHIAN TUBE DYSFUNCTION), UNSPECIFIED LATERALITY: ICD-10-CM

## 2023-02-21 DIAGNOSIS — H72.91 PERFORATION OF RIGHT TYMPANIC MEMBRANE: Primary | ICD-10-CM

## 2023-02-21 PROCEDURE — 99203 OFFICE O/P NEW LOW 30 MIN: CPT | Performed by: OTOLARYNGOLOGY

## 2023-02-21 RX ORDER — DIPHENOXYLATE HYDROCHLORIDE AND ATROPINE SULFATE 2.5; .025 MG/1; MG/1
TABLET ORAL
COMMUNITY

## 2023-02-21 RX ORDER — FLUTICASONE PROPIONATE 50 MCG
2 SPRAY, SUSPENSION (ML) NASAL DAILY
Qty: 16 G | Refills: 11 | Status: SHIPPED | OUTPATIENT
Start: 2023-02-21

## 2023-02-21 RX ORDER — ACYCLOVIR 50 MG/G
1 OINTMENT TOPICAL
COMMUNITY

## 2023-02-23 NOTE — PROGRESS NOTES
"Subjective   Carin Figueredo is a 62 y.o. female.       History of Present Illness   Patient states that 1 month ago she cleaned her ear with a Q-tip and felt a \"pop\".  Subsequently had drainage and was treated with oral antibiotics and antibiotic eardrops.  Had trouble with ear infections as a child but never had tubes.  Does have seasonal allergy symptoms.  Feels like her hearing is decreased on the right more than the left.      The following portions of the patient's history were reviewed and updated as appropriate: allergies, current medications, past family history, past medical history, past social history, past surgical history and problem list.     reports that she has never smoked. She has never used smokeless tobacco. She reports that she does not drink alcohol and does not use drugs.   Patient is not a tobacco user and has not been counseled for use of tobacco products      Review of Systems        Objective   Physical Exam    Right ear canal shows crusted material in the canal that is cleaned under the microscope to the limits of patient tolerance.  Tympanic membrane shows a small posterior perforation and a myringostapediopexy.  Left ear no discharge.  Tympanic membrane intact and clear.  Nares boggy mucosa no discharge       Assessment and Plan   Diagnoses and all orders for this visit:    1. Perforation of right tympanic membrane (Primary)    2. ETD (Eustachian tube dysfunction), unspecified laterality    3. Chronic rhinitis    Other orders  -     fluticasone (FLONASE) 50 MCG/ACT nasal spray; 2 sprays into the nostril(s) as directed by provider Daily.  Dispense: 16 g; Refill: 11               Plan: Add Flonase 2 sprays each nostril daily.  Keep water out of the right ear.  Return in 6 weeks with an audiogram and call sooner for problems.  "

## 2023-04-11 ENCOUNTER — OFFICE VISIT (OUTPATIENT)
Dept: OTOLARYNGOLOGY | Facility: CLINIC | Age: 63
End: 2023-04-11
Payer: MEDICARE

## 2023-04-11 ENCOUNTER — CLINICAL SUPPORT (OUTPATIENT)
Dept: AUDIOLOGY | Facility: CLINIC | Age: 63
End: 2023-04-11
Payer: MEDICARE

## 2023-04-11 VITALS — OXYGEN SATURATION: 97 % | HEIGHT: 61 IN | BODY MASS INDEX: 23.33 KG/M2 | WEIGHT: 123.6 LBS

## 2023-04-11 DIAGNOSIS — Z86.69 HISTORY OF PERFORATED EAR DRUM: ICD-10-CM

## 2023-04-11 DIAGNOSIS — H90.3 SENSORINEURAL HEARING LOSS (SNHL) OF BOTH EARS: Primary | ICD-10-CM

## 2023-04-11 DIAGNOSIS — H93.13 TINNITUS OF BOTH EARS: ICD-10-CM

## 2023-04-11 DIAGNOSIS — H90.3 SENSORINEURAL HEARING LOSS OF BOTH EARS: Primary | ICD-10-CM

## 2023-04-11 PROCEDURE — 1159F MED LIST DOCD IN RCRD: CPT | Performed by: OTOLARYNGOLOGY

## 2023-04-11 PROCEDURE — 1160F RVW MEDS BY RX/DR IN RCRD: CPT | Performed by: OTOLARYNGOLOGY

## 2023-04-11 PROCEDURE — 92557 COMPREHENSIVE HEARING TEST: CPT | Performed by: AUDIOLOGIST

## 2023-04-11 PROCEDURE — 99213 OFFICE O/P EST LOW 20 MIN: CPT | Performed by: OTOLARYNGOLOGY

## 2023-04-11 PROCEDURE — 92567 TYMPANOMETRY: CPT | Performed by: AUDIOLOGIST

## 2023-04-11 NOTE — PROGRESS NOTES
Subjective   Carin Figueredo is a 62 y.o. female.       History of Present Illness   Patient had been seen previously with a perforation of the right tympanic membrane due to Q-tip trauma.  Has been using Flonase.  Reports her hearing is still decreased subjectively.    The following portions of the patient's history were reviewed and updated as appropriate: allergies, current medications, past family history, past medical history, past social history, past surgical history and problem list.     reports that she has never smoked. She has never used smokeless tobacco. She reports that she does not drink alcohol and does not use drugs.   Patient is not a tobacco user and has not been counseled for use of tobacco products      Review of Systems        Objective   Physical Exam  Ears: No discharge.  Tympanic membranes are now intact with some tympanosclerosis but no infection or effusion.  Audiogram is obtained and reviewed and shows a bilateral borderline to mild sensorineural hearing loss.  Slightly worse on the right than the left at 500 Hz and 1000 Hz.  Tympanograms are type a bilaterally.  Discrimination scores are 100% bilaterally.         Assessment and Plan   Diagnoses and all orders for this visit:    1. Sensorineural hearing loss of both ears (Primary)             Plan: Reassured the patient that her tympanic membrane was intact and her hearing was essentially the same on the right as the left.  She perceives her hearing loss to be a significant problem.  I told her I thought conventional hearing aids would likely make things too loud but if she wanted to try over-the-counter hearing aids I think that would be reasonable.  She may follow-up with me as needed for further problems.

## 2023-04-11 NOTE — PROGRESS NOTES
"STANDARD AUDIOMETRIC EVALUATION      Name:  Carin Figueredo  :  1960  Age:  62 y.o.  Date of Evaluation:  2023      HISTORY    Reason for visit:  Carin Figueredo is seen today for a hearing test at the request of Dr. Nikhil Fam.  Patient reports she has hearing loss in both ears, but worse in the right ear.  She states she has to turn the TV up louder, she has problems hearing speech in noise, and she is saying \"what?\" more often.  She states she hears buzzing in both ears, and she has allergies.  She states she recently got a hole in her right ear drum due to using a Q-tip in her ear, and this is a recheck of her ears.  She states she is not sure if she had any change in her hearing after that event.        EVALUATION    See Audiogram    RESULTS        Otoscopy and Tympanometry 226 Hz :  Right Ear:  Otoscopy:  Clear ear canal          Tympanometry:  Middle ear function within normal limits    Left Ear:   Otoscopy:  Clear ear canal        Tympanometry:  Middle ear function within normal limits    Test technique:  Standard Audiometry     Pure Tone Audiometry:   Patient responded to pure tones at 5-35 dB for 250-8000 Hz in right ear, and at 10-25 dB for 250-8000 Hz in left ear.       Speech Audiometry:        Right Ear:  Speech Reception Threshold (SRT) was obtained at 20 dBHL                 Speech Discrimination scores were 100% in quiet when words were presented at 60 dBHL       Left Ear:  Speech Reception Threshold (SRT) was obtained at 20 dBHL                 Speech Discrimination scores were 100% in quiet when words were presented at 60 dBHL    Reliability:   good    IMPRESSIONS:  1.  Tympanometry results are consistent with Middle ear function within normal limits in both ears.  2.  Pure tone results are consistent with within normal limits to mild rising, then sloping sensorineural hearing loss for right ear, and borderline within normal limits with sensorineural component for left ear. "       RECOMMENDATIONS:  Patient is seeing the Ear Nose and Throat physician immediately following this examination.  It was a pleasure seeing Carin Figueredo in Audiology today.  We would be happy to do further testing or discuss these test as necessary.          This document has been electronically signed by Anne Bravo MS CCC-SHERWIN on April 11, 2023 10:10 CDT       Anne Bravo MS CCC-SHERWIN  Licensed Audiologist

## 2023-09-25 ENCOUNTER — OFFICE VISIT (OUTPATIENT)
Dept: ORTHOPEDIC SURGERY | Facility: CLINIC | Age: 63
End: 2023-09-25
Payer: MEDICARE

## 2023-09-25 VITALS — WEIGHT: 129 LBS | HEIGHT: 61 IN | BODY MASS INDEX: 24.35 KG/M2

## 2023-09-25 DIAGNOSIS — G89.29 CHRONIC BILATERAL LOW BACK PAIN WITHOUT SCIATICA: ICD-10-CM

## 2023-09-25 DIAGNOSIS — M54.50 LUMBAR SPINE PAIN: Primary | ICD-10-CM

## 2023-09-25 DIAGNOSIS — M54.50 CHRONIC BILATERAL LOW BACK PAIN WITHOUT SCIATICA: ICD-10-CM

## 2023-09-25 DIAGNOSIS — M51.36 DDD (DEGENERATIVE DISC DISEASE), LUMBAR: ICD-10-CM

## 2023-09-25 DIAGNOSIS — Z78.0 POSTMENOPAUSAL: ICD-10-CM

## 2023-09-25 DIAGNOSIS — Z13.820 OSTEOPOROSIS SCREENING: ICD-10-CM

## 2023-09-25 DIAGNOSIS — M47.816 FACET ARTHROPATHY, LUMBAR: ICD-10-CM

## 2023-09-25 PROCEDURE — 1160F RVW MEDS BY RX/DR IN RCRD: CPT | Performed by: NURSE PRACTITIONER

## 2023-09-25 PROCEDURE — 96372 THER/PROPH/DIAG INJ SC/IM: CPT | Performed by: NURSE PRACTITIONER

## 2023-09-25 PROCEDURE — 1159F MED LIST DOCD IN RCRD: CPT | Performed by: NURSE PRACTITIONER

## 2023-09-25 PROCEDURE — 99204 OFFICE O/P NEW MOD 45 MIN: CPT | Performed by: NURSE PRACTITIONER

## 2023-09-25 RX ORDER — EZETIMIBE 10 MG/1
10 TABLET ORAL DAILY
COMMUNITY

## 2023-09-25 RX ORDER — KETOROLAC TROMETHAMINE 30 MG/ML
60 INJECTION, SOLUTION INTRAMUSCULAR; INTRAVENOUS ONCE
Status: COMPLETED | OUTPATIENT
Start: 2023-09-25 | End: 2023-09-25

## 2023-09-25 RX ORDER — TRIAMCINOLONE ACETONIDE 40 MG/ML
80 INJECTION, SUSPENSION INTRA-ARTICULAR; INTRAMUSCULAR ONCE
Status: COMPLETED | OUTPATIENT
Start: 2023-09-25 | End: 2023-09-25

## 2023-09-25 RX ADMIN — KETOROLAC TROMETHAMINE 60 MG: 30 INJECTION, SOLUTION INTRAMUSCULAR; INTRAVENOUS at 15:17

## 2023-09-25 RX ADMIN — TRIAMCINOLONE ACETONIDE 80 MG: 40 INJECTION, SUSPENSION INTRA-ARTICULAR; INTRAMUSCULAR at 15:18

## 2023-09-25 NOTE — PROGRESS NOTES
Carin Figueredo is a 63 y.o. female   Primary provider:  Yonny Hazel MD       Chief Complaint   Patient presents with    Lumbar Spine - Back Pain, Initial Evaluation     HISTORY OF PRESENT ILLNESS:    History of Present Illness  63-year-old female patient presents to office for evaluation of chronic low back pain.  Initial onset of pain occurred 10+ years ago with no known injury or incident.  Patient describes pain across her low back that sometimes radiates into her lower extremities intermittently.  Pain is described as constant and moderate in severity.  Pain is described as aching and stabbing in nature with intermittent popping sensations.  Pain is worse with standing, walking and activity.  Pain improves mildly with rest, ice therapy, heat therapy, Neurontin and pain medication.  Patient has no history of any prior back surgeries and denies any known injuries to her lumbar spine.  Patient currently takes Tramadol 50 mg 6 times daily and Neurontin 300 mg 5 times daily for management of her chronic pain as prescribed by other providers.  X-rays of the lumbar spine performed in office today.  Current pain scale is 7/10.     CONCURRENT MEDICAL HISTORY:    Past Medical History:   Diagnosis Date    Hypertension        Allergies   Allergen Reactions    Keflex [Cephalexin] Other (See Comments)     Upset stomach         Current Outpatient Medications:     acyclovir (ZOVIRAX) 5 % ointment, 1 application., Disp: , Rfl:     cetirizine (zyrTEC) 10 MG tablet, Take 1 tablet by mouth Daily., Disp: , Rfl:     Cholecalciferol (VITAMIN D) 50 MCG (2000 UT) tablet, Take 1 tablet by mouth., Disp: , Rfl:     dicyclomine (BENTYL) 10 MG capsule, Take 1 capsule by mouth 3 (Three) Times a Day With Meals., Disp: , Rfl:     ezetimibe (Zetia) 10 MG tablet, Take 1 tablet by mouth Daily., Disp: , Rfl:     famotidine (PEPCID) 40 MG tablet, Take 1 tablet by mouth 2 (Two) Times a Day., Disp: , Rfl:     fluticasone (FLONASE) 50 MCG/ACT  nasal spray, 2 sprays into the nostril(s) as directed by provider Daily., Disp: 16 g, Rfl: 11    gabapentin (NEURONTIN) 300 MG capsule, 1 capsule. Take 1 capsule every morning, 1 capsule at noon, and 3 capsules HS, Disp: , Rfl:     lisinopril (PRINIVIL,ZESTRIL) 20 MG tablet, Take 1 tablet by mouth Daily., Disp: , Rfl:     LORazepam (ATIVAN) 0.5 MG tablet, Take 1 tablet by mouth 2 (Two) Times a Day As Needed for Anxiety., Disp: , Rfl:     MAGNESIUM PO, Take 400 mg by mouth Daily., Disp: , Rfl:     methylphenidate (RITALIN) 5 MG tablet, Take 1 tablet by mouth 2 (Two) Times a Day. 7.5 mg, Disp: , Rfl:     metoprolol succinate XL (TOPROL-XL) 100 MG 24 hr tablet, Take 1 tablet by mouth Daily., Disp: , Rfl:     multivitamin (THERAGRAN) tablet tablet, Take  by mouth., Disp: , Rfl:     potassium chloride (MICRO-K) 10 MEQ CR capsule, Take 1 capsule by mouth 2 (Two) Times a Day., Disp: , Rfl:     simethicone (MYLICON,GAS-X) 180 MG capsule, Take 1 capsule by mouth 3 (Three) Times a Day., Disp: , Rfl:     traMADol (ULTRAM) 50 MG tablet, Take 2 tablets by mouth Every 8 (Eight) Hours As Needed., Disp: , Rfl:     vilazodone (VIIBRYD) 40 MG tablet tablet, Take 1 tablet by mouth Daily., Disp: , Rfl:     Past Surgical History:   Procedure Laterality Date    APPENDECTOMY      CHOLECYSTECTOMY      FOOT SURGERY Right     HYSTERECTOMY         History reviewed. No pertinent family history.    Social History     Socioeconomic History    Marital status:    Tobacco Use    Smoking status: Never    Smokeless tobacco: Never   Substance and Sexual Activity    Alcohol use: Never    Drug use: Never        Review of Systems   Constitutional: Negative.    HENT:  Positive for tinnitus.    Eyes: Negative.    Respiratory: Negative.     Cardiovascular: Negative.    Gastrointestinal: Negative.    Endocrine: Negative.    Genitourinary: Negative.    Musculoskeletal:  Positive for arthralgias and back pain.        Joint pain/stiffness.  Muscle pain.  "  Skin: Negative.         Itching   Allergic/Immunologic: Negative.    Neurological: Negative.    Hematological: Negative.    Psychiatric/Behavioral:  The patient is nervous/anxious.         Anxiety/depression     PHYSICAL EXAMINATION:       Ht 154.9 cm (61\")   Wt 58.5 kg (129 lb)   BMI 24.37 kg/m²     Physical Exam  Vitals reviewed.   Constitutional:       General: She is not in acute distress.     Appearance: She is well-developed. She is not ill-appearing.   HENT:      Head: Normocephalic.   Pulmonary:      Effort: Pulmonary effort is normal. No respiratory distress.   Abdominal:      General: There is no distension.      Palpations: Abdomen is soft.   Musculoskeletal:         General: Tenderness (Mild, lumbar spine) present. No swelling, deformity or signs of injury.      Lumbar back: Positive right straight leg raise test and positive left straight leg raise test.   Skin:     General: Skin is warm and dry.      Capillary Refill: Capillary refill takes less than 2 seconds.      Findings: No erythema.   Neurological:      Mental Status: She is alert and oriented to person, place, and time.      GCS: GCS eye subscore is 4. GCS verbal subscore is 5. GCS motor subscore is 6.      Sensory: No sensory deficit.   Psychiatric:         Speech: Speech normal.         Behavior: Behavior normal.         Thought Content: Thought content normal.         Judgment: Judgment normal.       GAIT:     []  Normal  []  Antalgic    Assistive device: [x]  None  []  Walker     []  Crutches  []  Cane     []  Wheelchair  []  Stretcher    Back Exam     Tenderness   The patient is experiencing tenderness in the lumbar (Mild).    Tests   Straight leg raise right: positiveRight straight leg raise test: Mild.  Straight leg raise left: positiveLeft straight leg raise test: Mild.    Other   Sensation: normal  Gait: antalgic (Mild)   Erythema: no back redness    Comments:  Mild tenderness in the lower lumbar spine and across the low back.  No " focal neurological deficits noted.          XR Spine Lumbar 2 or 3 View    Result Date: 9/25/2023  Narrative: INDICATION: Back pain. FINDINGS: Frontal and lateral views were obtained.  There is normal alignment.  Vertebral bodies and disc spaces are of normal height.  Visualized soft tissues are unremarkable.     Impression: Normal lumbar spine radiographs.       ASSESSMENT:    Diagnoses and all orders for this visit:    Lumbar spine pain  -     XR Spine Lumbar 2 or 3 View  -     triamcinolone acetonide (KENALOG-40) injection 80 mg  -     ketorolac (TORADOL) injection 60 mg  -     MRI Lumbar Spine Without Contrast; Future    Chronic bilateral low back pain without sciatica  -     MRI Lumbar Spine Without Contrast; Future    Postmenopausal  -     DEXA Bone Density Axial; Future    Osteoporosis screening  -     DEXA Bone Density Axial; Future    DDD (degenerative disc disease), lumbar  -     MRI Lumbar Spine Without Contrast; Future    Facet arthropathy, lumbar  -     MRI Lumbar Spine Without Contrast; Future    Other orders  -     ezetimibe (Zetia) 10 MG tablet; Take 1 tablet by mouth Daily.    PLAN    X-rays of the lumbar spine performed in office today and reviewed with no acute findings noted.  Patient has evidence of some mild facet arthropathy in the lower lumbar spine.  Overall, disc bases appear to be well preserved but perhaps she has some mild narrowing of the disc bases in the lower levels of the lumbar spine.  There are no significant degenerative changes noted.    Patient complains of chronic low back pain for 10+ years with no initial injury or incident.  She has not had any prior surgeries to her lumbar spine and denies any previous trauma or injuries to the lumbar spine.  Overall, patient states her low back pain has been progressively worsening.  She describes pain across her low back that sometimes radiates into her lower extremities intermittently but she does not describe any true radicular pain at  this time.  We discussed multiple possibilities as a source of her pain.  However, due to the chronicity of her pain, I have recommended proceeding with MRI imaging of the lumbar spine to evaluate for disc herniations, spinal stenosis, foraminal stenosis and/or nerve compression.  We discussed that MRI imaging may help to offer a more definitive diagnosis, which will help to guide further treatment recommendations.    Recommend intramuscular injections of Toradol 60 mg and Kenalog 80 mg today for management of pain/inflammation.    Recommend the following:    -Rest and activity modification for now with avoidance of straining, lifting, pulling, tugging, etc.  -Use of ice therapy and/or heat therapy to the low back to minimize pain/inflammation/muscle tension.  -Use of a cane or walker for modified weightbearing from the lumbar spine.  -Tylenol, Aleve or Ibuprofen as needed for pain.      Patient already takes Tramadol 50 mg 6 times daily and Neurontin 300 mg 5 times daily for management of chronic pain as prescribed by another provider.     Patient also inquires today about a baseline DEXA scan.  She has never had a bone density study.  Patient reports that she had a hysterectomy at age 46 and has been postmenopausal since that time.  Recommend proceeding with DEXA scan to evaluate for underlying osteopenia or osteoporosis.  We can discuss those results when she returns to office following her MRI.    Follow-up after MRI to discuss results and further treatment recommendations.  We can also discuss her DEXA scan results at that time as well.  Consider referral to physical therapy.    Time spent of a minimum of 45 minutes including the face to face evaluation, reviewing of medical history and prior medial records, reviewing of diagnostic studies, ordering additional tests, documentation, patient education and coordination of care.      EMR Dragon/[employer] Disclaimer: Some of this note may be an electronic  transcription/translation of spoken language to printed text using the Dragon Dictation System.      Return for follow up after MRI.       This document has been electronically signed by CHRISTOFER Morales on September 27, 2023 14:30 CDT       CHRISTOFER Morales

## 2023-09-26 PROBLEM — M54.50 CHRONIC BILATERAL LOW BACK PAIN WITHOUT SCIATICA: Status: ACTIVE | Noted: 2023-09-26

## 2023-09-26 PROBLEM — M47.816 FACET ARTHROPATHY, LUMBAR: Status: ACTIVE | Noted: 2023-09-26

## 2023-09-26 PROBLEM — M51.36 DDD (DEGENERATIVE DISC DISEASE), LUMBAR: Status: ACTIVE | Noted: 2023-09-26

## 2023-09-26 PROBLEM — Z78.0 POSTMENOPAUSAL: Status: ACTIVE | Noted: 2023-09-26

## 2023-09-26 PROBLEM — Z13.820 OSTEOPOROSIS SCREENING: Status: ACTIVE | Noted: 2023-09-26

## 2023-09-26 PROBLEM — G89.29 CHRONIC BILATERAL LOW BACK PAIN WITHOUT SCIATICA: Status: ACTIVE | Noted: 2023-09-26
